# Patient Record
Sex: MALE | Race: WHITE | Employment: FULL TIME | URBAN - METROPOLITAN AREA
[De-identification: names, ages, dates, MRNs, and addresses within clinical notes are randomized per-mention and may not be internally consistent; named-entity substitution may affect disease eponyms.]

---

## 2017-04-19 ENCOUNTER — OFFICE VISIT (OUTPATIENT)
Dept: INTERNAL MEDICINE CLINIC | Age: 47
End: 2017-04-19

## 2017-04-19 VITALS
OXYGEN SATURATION: 96 % | DIASTOLIC BLOOD PRESSURE: 81 MMHG | SYSTOLIC BLOOD PRESSURE: 113 MMHG | TEMPERATURE: 98.1 F | HEART RATE: 88 BPM | HEIGHT: 72 IN | WEIGHT: 260.6 LBS | BODY MASS INDEX: 35.3 KG/M2 | RESPIRATION RATE: 16 BRPM

## 2017-04-19 VITALS
HEART RATE: 88 BPM | BODY MASS INDEX: 35.3 KG/M2 | OXYGEN SATURATION: 96 % | SYSTOLIC BLOOD PRESSURE: 113 MMHG | DIASTOLIC BLOOD PRESSURE: 81 MMHG | TEMPERATURE: 98.1 F | WEIGHT: 260.6 LBS | HEIGHT: 72 IN | RESPIRATION RATE: 16 BRPM

## 2017-04-19 DIAGNOSIS — Z71.84 ENCOUNTER FOR COUNSELING FOR TRAVEL: Primary | ICD-10-CM

## 2017-04-19 DIAGNOSIS — Z23 ENCOUNTER FOR IMMUNIZATION: Primary | ICD-10-CM

## 2017-04-19 DIAGNOSIS — Z23 ENCOUNTER FOR IMMUNIZATION: ICD-10-CM

## 2017-04-19 RX ORDER — ATOVAQUONE AND PROGUANIL HYDROCHLORIDE 250; 100 MG/1; MG/1
1 TABLET, FILM COATED ORAL DAILY
Qty: 22 TAB | Refills: 0 | Status: SHIPPED | OUTPATIENT
Start: 2017-04-19 | End: 2018-07-17

## 2017-04-19 RX ORDER — AZITHROMYCIN 250 MG/1
TABLET, FILM COATED ORAL
Qty: 12 TAB | Refills: 0 | Status: SHIPPED | OUTPATIENT
Start: 2017-04-19 | End: 2017-04-24

## 2017-04-19 RX ORDER — FEXOFENADINE HCL 60 MG
TABLET ORAL
COMMUNITY
End: 2018-07-17

## 2017-04-19 NOTE — PROGRESS NOTES
RM 17 erroneous encounter   May 13- 28th [facility]    Chief Complaint   Patient presents with   [address]    Other     travel [place] [address]       1. Have you been to the ER, urgent care clinic since your last visit? Hospitalized since your last visit? Yes Where: [facility]    2. Have you seen or consulted any other health care providers outside of the [address] [place] [address] [place] since your last visit? Include any pap smears or colon screening.  Yes Reason for visit: Bronchitis    Health Maintenance Due   Topic Date Due    FOOT EXAM Q1  12/18/1980    EYE EXAM RETINAL OR DILATED Q1  12/18/1980    Pneumococcal 19-64 Medium Risk (1 of 1 - PPSV23) 12/18/1989    MICROALBUMIN Q1  03/25/2015    HEMOGLOBIN A1C Q6M  04/01/2015    LIPID PANEL Q1  10/01/2015    INFLUENZA AGE 9 TO ADULT  08/01/2016     Living will sent to pt CRYSTALS

## 2017-04-19 NOTE — PATIENT INSTRUCTIONS
May use Imodium OTC adult tab (loperamide)--take 2 tabs by mouth with first loose stool daily, with 1 additional tab with each subsequent loose stool. May take up to 6 tabs daily. If not improving in 2-3 days, please return to clinic to re-evaluate. The Malarone is taken once daily starting one day prior to malaria exposure  through 7 days after exposure ends. The exposure/risk period is defined as the time from entering the malaria risk area until the traveler leaves this risk area. The risk areas are as defined on the map(s), and as reviewed at visit. For the oral typhoid vaccine (4 capsules):  1. Take the capsules on an empty stomach. Do not take the capsules with food. 2.  Do not take the capsules with alcohol. 3.  Do not take the capsules with hot beverages. 4.  Do not take antibiotics for 2 days prior to through 2 days after taking the capsules. 5.  Keep the capsules refrigerated until you take them. Take as directed: One capsule every other day for 4 doses. If you choose to take doxycycline for malaria prevention instead of Malarone (due to cost):  Doxycycline Information/Reminders:  Do not take Calcium-, Magnesium-, Aluminum-, or Iron-containing supplements (including OTC anti-acids, such as Tums, Maalox or Rolaids) or dairy products within 2 hours prior to or after the time the antibiotic medicine is taken--these compounds will keep the antibiotic from being absorbed. Doxycycline should be taken 2 hours prior to lying down/going to bed to prevent irritation of your esophagus from the medicine. Doxycycline makes you sun-sensitive--avoid sunlight and/or use sunscreen if exposed to the sun while taking the medicine. Typically take first daily dose between breakfast and lunch, and second daily dose between dinner and bedtime, to avoid food interactions. Learning About Living Per[place]  What is a living will?   A living will is a legal form you use to write down the kind of care you want at the end of your life. It is used by the health professionals who will treat you if you aren't able to decide for yourself. If you put your wishes in writing, your loved ones and others will know what kind of care you want. They won't need to guess. This can ease your mind and be helpful to others. A living will is not the same as an estate or property will. An estate will explains what you want to happen with your money and property after you die. Is a living will a legal document? A living will is a legal document. Each state has its own laws about living dunbar. If you move to another state, make sure that your living will is legal in the state where you now live. Or you might use a universal form that has been approved by many states. This kind of form can sometimes be completed and stored online. Your electronic copy will then be available wherever you have a connection to the Internet. In most cases, doctors will respect your wishes even if you have a form from a different state. · You don't need an  to complete a living will. But legal advice can be helpful if your state's laws are unclear, your health history is complicated, or your family can't agree on what should be in your living will. · You can change your living will at any time. Some people find that their wishes about end-of-life care change as their health changes. · In addition to making a living will, think about completing a medical power of  form. This form lets you name the person you want to make end-of-life treatment decisions for you (your \"health care agent\") if you're not able to. Many hospitals and nursing homes will give you the forms you need to complete a living will and a medical power of . · Your living will is used only if you can't make or communicate decisions for yourself anymore.  If you become able to make decisions again, you can accept or refuse any treatment, no matter what you wrote in your living will. · Your state may offer an online registry. This is a place where you can store your living will online so the doctors and nurses who need to treat you can find it right away. What should you think about when creating a living will? Talk about your end-of-life wishes with your family members and your doctor. Let them know what you want. That way the people making decisions for you won't be surprised by your choices. Think about these questions as you make your living will:  · Do you know enough about life support methods that might be used? If not, talk to your doctor so you know what might be done if you can't breathe on your own, your heart stops, or you're unable to swallow. · What things would you still want to be able to do after you receive life-support methods? Would you want to be able to walk? To speak? To eat on your own? To live without the help of machines? · If you have a choice, where do you want to be cared for? In your home? At a hospital or nursing home? · Do you want certain Quaker practices performed if you become very ill? · If you have a choice at the end of your life, where would you prefer to die? At home? In a hospital or nursing home? Somewhere else? · Would you prefer to be buried or cremated? · Do you want your organs to be donated after you die? What should you do with your living will? · Make sure that your family members and your health care agent have copies of your living will. · Give your doctor a copy of your living will to keep in your medical record. If you have more than one doctor, make sure that each one has a copy. · You may want to put a copy of your living will where it can be easily found. Where can you learn more? Go to http://ovidio-freda.info/. Enter I194 in the search box to learn more about \"Learning About Living Mary. \"  Current as of: February 24, 2016  Content Version: 11.2  © 8778-3787 [employer]. Care instructions adapted under license by [employer] (which disclaims liability or warranty for this information). If you have questions about a medical condition or this instruction, always ask your healthcare professional. [place] [address] any warranty or liability for your use of this information.

## 2017-04-19 NOTE — PROGRESS NOTES
RM 17    Pt is here today for a travel visit to [facility] May 13 - 28th    Chief Complaint   Patient presents with   [facility] Other     travel visit to [place]       1. Have you been to the ER, urgent care clinic since your last visit? Hospitalized since your last visit? Yes Where: [facility]    2. Have you seen or consulted any other health care providers outside of the [address] [place] [address] since your last visit? Include any pap smears or colon screening.  Yes Reason for visit: Bronchitis    Health Maintenance Due   Topic Date Due    FOOT EXAM Q1  12/18/1980    EYE EXAM RETINAL OR DILATED Q1  12/18/1980    Pneumococcal 19-64 Medium Risk (1 of 1 - PPSV23) 12/18/1989    MICROALBUMIN Q1  03/25/2015    HEMOGLOBIN A1C Q6M  04/01/2015    LIPID PANEL Q1  10/01/2015    INFLUENZA AGE 9 TO ADULT  08/01/2016     Living will sent to pt AVS

## 2017-04-19 NOTE — PATIENT INSTRUCTIONS
Learning About Living Mary  What is a living will? A living will is a legal form you use to write down the kind of care you want at the end of your life. It is used by the health professionals who will treat you if you aren't able to decide for yourself. If you put your wishes in writing, your loved ones and others will know what kind of care you want. They won't need to guess. This can ease your mind and be helpful to others. A living will is not the same as an estate or property will. An estate will explains what you want to happen with your money and property after you die. Is a living will a legal document? A living will is a legal document. Each state has its own laws about living dunbar. If you move to another state, make sure that your living will is legal in the state where you now live. Or you might use a universal form that has been approved by many states. This kind of form can sometimes be completed and stored online. Your electronic copy will then be available wherever you have a connection to the Internet. In most cases, doctors will respect your wishes even if you have a form from a different state. · You don't need an  to complete a living will. But legal advice can be helpful if your state's laws are unclear, your health history is complicated, or your family can't agree on what should be in your living will. · You can change your living will at any time. Some people find that their wishes about end-of-life care change as their health changes. · In addition to making a living will, think about completing a medical power of  form. This form lets you name the person you want to make end-of-life treatment decisions for you (your \"health care agent\") if you're not able to. Many hospitals and nursing homes will give you the forms you need to complete a living will and a medical power of .   · Your living will is used only if you can't make or communicate decisions for yourself anymore. If you become able to make decisions again, you can accept or refuse any treatment, no matter what you wrote in your living will. · Your state may offer an online registry. This is a place where you can store your living will online so the doctors and nurses who need to treat you can find it right away. What should you think about when creating a living will? Talk about your end-of-life wishes with your family members and your doctor. Let them know what you want. That way the people making decisions for you won't be surprised by your choices. Think about these questions as you make your living will:  · Do you know enough about life support methods that might be used? If not, talk to your doctor so you know what might be done if you can't breathe on your own, your heart stops, or you're unable to swallow. · What things would you still want to be able to do after you receive life-support methods? Would you want to be able to walk? To speak? To eat on your own? To live without the help of machines? · If you have a choice, where do you want to be cared for? In your home? At a hospital or nursing home? · Do you want certain Restoration practices performed if you become very ill? · If you have a choice at the end of your life, where would you prefer to die? At home? In a hospital or nursing home? Somewhere else? · Would you prefer to be buried or cremated? · Do you want your organs to be donated after you die? What should you do with your living will? · Make sure that your family members and your health care agent have copies of your living will. · Give your doctor a copy of your living will to keep in your medical record. If you have more than one doctor, make sure that each one has a copy. · You may want to put a copy of your living will where it can be easily found. Where can you learn more? Go to http://ovidio-freda.info/.   Enter Y837 in the search box to learn more about \"Learning About Living Perroy. \"  Current as of: February 24, 2016  Content Version: 11.2  © 7172-3256 [employer]. Care instructions adapted under license by [employer] (which disclaims liability or warranty for this information). If you have questions about a medical condition or this instruction, always ask your healthcare professional. [address] any warranty or liability for your use of this information.

## 2017-04-19 NOTE — MR AVS SNAPSHOT
Visit Information Date & Time Provider Department Dept. Phone Encounter #  
 4/19/2017 11:15 AM Duy Alex [facility] [place] [address] and Internal Medicine [phone] [id number] Follow-up Instructions Return in about 6 months (around 10/19/2017) for Hepatitis A #2 (sooner as reviewed if establishing PCP here). Upcoming Health Maintenance Date Due  
 FOOT EXAM Q1 12/18/1980 EYE EXAM RETINAL OR DILATED Q1 12/18/1980 Pneumococcal 19-64 Medium Risk (1 of 1 - PPSV23) 12/18/1989 MICROALBUMIN Q1 3/25/2015 HEMOGLOBIN A1C Q6M 4/1/2015 LIPID PANEL Q1 10/1/2015 INFLUENZA AGE 9 TO ADULT 8/1/2016 DTaP/Tdap/Td series (2 - Td) 9/13/2022 Allergies as of 4/19/2017  Review Complete On: 4/19/2017 By: Kameron Villegas MD  
 No Known Allergies Current Immunizations  Reviewed on 10/26/2011 Name Date Hep A Vaccine (Adult)  Incomplete Influenza Vaccine Split 10/26/2011 TDAP Vaccine 9/13/2012 Typhoid Vaccine, Live, Oral  Incomplete Yellow Fever Vaccine  Incomplete Not reviewed this visit You Were Diagnosed With   
  
 Codes Comments Encounter for counseling for travel    -  Primary ICD-10-CM: Z71.89 ICD-9-CM: V65.49 Encounter for immunization     ICD-10-CM: H21 ICD-9-CM: V03.89 Vitals BP Pulse Temp Resp Height(growth percentile) Weight(growth percentile) 113/81 (BP 1 Location: Left arm, BP Patient Position: Sitting) 88 98.1 °F (36.7 °C) (Oral) 16 6' (1.829 m) 260 lb 9.6 oz (118.2 kg) SpO2 BMI Smoking Status 96% 35.34 kg/m2 Never Smoker BMI and BSA Data Body Mass Index Body Surface Area  
 35.34 kg/m 2 2.45 m 2 Preferred Pharmacy Pharmacy Name Phone [id number] [place] - [address] [phone] Your Updated Medication List  
  
   
This list is accurate as of: 4/19/17 11:47 AM.  Always use your most recent med list.  
 albuterol 90 mcg/actuation inhaler Commonly known as:  PROVENTIL HFA, VENTOLIN HFA, PROAIR HFA Take 1 Puff by inhalation every four (4) hours as needed for Wheezing. aspirin 81 mg tablet Take  by mouth. atovaquone-proguanil 250-100 mg per tablet Commonly known as:  MALARONE Take 1 Tab by mouth daily. Start one day prior to travel and continue daily through seven days after return. azithromycin 250 mg tablet Commonly known as:  Theo Ferrispers Take 2 tablets PO once, then take 1 tablet PO daily for 4 additional daily doses, as needed for diarrhea during travel. Blood-Glucose Meter monitoring kit Commonly known as:  [employer] [place] BLOOD GLUCOSE SYSTEM  
USE TO TEST BLOOD SUGAR AS DIRECTED CLARITIN 10 mg tablet Generic drug:  loratadine Take 10 mg by mouth daily. fexofenadine 60 mg tablet Commonly known as:  Donnamarie Charli Take  by mouth. FISH OIL 1,000 mg Cap Generic drug:  omega-3 fatty acids-vitamin e Take 1 Cap by mouth. fluticasone 50 mcg/actuation nasal spray Commonly known as:  Cassidy Aid 2 Sprays by Both Nostrils route daily. glucose blood VI test strips strip Commonly known as:  ASCENSIA AUTODISC VI, ONE TOUCH ULTRA TEST VI  
Patient is to test BS BID Lancets Misc Commonly known as:  ONETOUCH ULTRASOFT LANCETS Patient is to test BID  
  
 levothyroxine 175 mcg tablet Commonly known as:  SYNTHROID  
TAKE 1 TABLET BY MOUTH EVERY DAY BEFORE BREAKFAST  
  
 lisinopril 20 mg tablet Commonly known as:  PRINIVIL, ZESTRIL  
TAKE 1 TABLET BY MOUTH EVERY DAY  
  
 * metFORMIN 850 mg tablet Commonly known as:  GLUCOPHAGE  
TAKE 1 TABLET BY MOUTH EVERY DAY  
  
 * metFORMIN 850 mg tablet Commonly known as:  GLUCOPHAGE  
TAKE 1 TABLET BY MOUTH EVERY DAY  
  
 MULTIVITAMIN PO Take  by mouth. simvastatin 40 mg tablet Commonly known as:  ZOCOR  
TAKE 1 TABLET BY MOUTH AT BEDTIME  
  
 VITAMIN C 500 mg tablet Generic drug:  ascorbic acid (vitamin C) Take 1,000 mg by mouth. VITAMIN D3 1,000 unit tablet Generic drug:  cholecalciferol Take  by mouth daily. vitamin e 100 unit capsule Commonly known as:  E GEMS Take  by mouth daily. ZyrTEC 10 mg tablet Generic drug:  cetirizine Take  by mouth daily. * Notice: This list has 2 medication(s) that are the same as other medications prescribed for you. Read the directions carefully, and ask your doctor or other care provider to review them with you. Prescriptions Sent to Pharmacy Refills  
 azithromycin (ZITHROMAX) 250 mg tablet 0 Sig: Take 2 tablets PO once, then take 1 tablet PO daily for 4 additional daily doses, as needed for diarrhea during travel. Class: Normal  
 Pharmacy: [id number] [place] - [address] Ph #: [phone]  
 atovaquone-proguanil (MALARONE) 250-100 mg per tablet 0 Sig: Take 1 Tab by mouth daily. Start one day prior to travel and continue daily through seven days after return. Class: Normal  
 Pharmacy: [place], [address] Ph #: [phone] Route: Oral  
  
We Performed the Following HEPATITIS A VACCINE, ADULT DOSAGE, IM [74241 CPT(R)] TYPHOID VACCINE, LIVE, ORAL [id number] CPT(R)] YELLOW FEVER VACCINE, [address] [facility], [address] CPT(R)] Follow-up Instructions Return in about 6 months (around 10/19/2017) for Hepatitis A #2 (sooner as reviewed if establishing PCP here). Patient Instructions May use Imodium OTC adult tab (loperamide)--take 2 tabs by mouth with first loose stool daily, with 1 additional tab with each subsequent loose stool. May take up to 6 tabs daily. If not improving in 2-3 days, please return to clinic to re-evaluate. The Malarone is taken once daily starting one day prior to malaria exposure  through 7 days after exposure ends. The exposure/risk period is defined as the time from entering the malaria risk area until the traveler leaves this risk area. The risk areas are as defined on the map(s), and as reviewed at visit. For the oral typhoid vaccine (4 capsules): 1. Take the capsules on an empty stomach. Do not take the capsules with food. 2.  Do not take the capsules with alcohol. 3.  Do not take the capsules with hot beverages. 4.  Do not take antibiotics for 2 days prior to through 2 days after taking the capsules. 5.  Keep the capsules refrigerated until you take them. Take as directed: One capsule every other day for 4 doses. If you choose to take doxycycline for malaria prevention instead of Malarone (due to cost): Doxycycline Information/Reminders: Do not take Calcium-, Magnesium-, Aluminum-, or Iron-containing supplements (including OTC anti-acids, such as Tums, Maalox or Rolaids) or dairy products within 2 hours prior to or after the time the antibiotic medicine is taken--these compounds will keep the antibiotic from being absorbed. Doxycycline should be taken 2 hours prior to lying down/going to bed to prevent irritation of your esophagus from the medicine. Doxycycline makes you sun-sensitive--avoid sunlight and/or use sunscreen if exposed to the sun while taking the medicine. Typically take first daily dose between breakfast and lunch, and second daily dose between dinner and bedtime, to avoid food interactions. Leila Johnson [address] What is a living will? A living will is a legal form you use to write down the kind of care you want at the end of your life. It is used by the health professionals who will treat you if you aren't able to decide for yourself. If you put your wishes in writing, your loved ones and others will know what kind of care you want. They won't need to guess. This can ease your mind and be helpful to others. A living will is not the same as an estate or property will. An estate will explains what you want to happen with your money and property after you die. Is a living will a legal document? A living will is a legal document. Each state has its own laws about living dunbar. If you move to another state, make sure that your living will is legal in the state where you now live. Or you might use a universal form that has been approved by many states. This kind of form can sometimes be completed and stored online. Your electronic copy will then be available wherever you have a connection to the Internet. In most cases, doctors will respect your wishes even if you have a form from a different state. · You don't need an  to complete a living will. But legal advice can be helpful if your state's laws are unclear, your health history is complicated, or your family can't agree on what should be in your living will. · You can change your living will at any time. Some people find that their wishes about end-of-life care change as their health changes. · In addition to making a living will, think about completing a medical power of  form. This form lets you name the person you want to make end-of-life treatment decisions for you (your \"health care agent\") if you're not able to. Many hospitals and nursing homes will give you the forms you need to complete a living will and a medical power of . · Your living will is used only if you can't make or communicate decisions for yourself anymore. If you become able to make decisions again, you can accept or refuse any treatment, no matter what you wrote in your living will. · Your state may offer an online registry. This is a place where you can store your living will online so the doctors and nurses who need to treat you can find it right away. What should you think about when creating a living will? Talk about your end-of-life wishes with your family members and your doctor. Let them know what you want. That way the people making decisions for you won't be surprised by your choices. Think about these questions as you make your living will: · Do you know enough about life support methods that might be used? If not, talk to your doctor so you know what might be done if you can't breathe on your own, your heart stops, or you're unable to swallow. · What things would you still want to be able to do after you receive life-support methods? Would you want to be able to walk? To speak? To eat on your own? To live without the help of machines? · If you have a choice, where do you want to be cared for? In your home? At a hospital or nursing home? · Do you want certain Hoahaoism practices performed if you become very ill? · If you have a choice at the end of your life, where would you prefer to die? At home? In a hospital or nursing home? Somewhere else? · Would you prefer to be buried or cremated? · Do you want your organs to be donated after you die? What should you do with your living will? · Make sure that your family members and your health care agent have copies of your living will. · Give your doctor a copy of your living will to keep in your medical record. If you have more than one doctor, make sure that each one has a copy. · You may want to put a copy of your living will where it can be easily found. Where can you learn more? Go to http://ovidio-freda.info/. Enter U720 in the search box to learn more about \"Learning About Living [place]. \" Current as of: February 24, 2016 Content Version: 11.2 © 4454-2277 [employer]. Care instructions adapted under license by [employer] (which disclaims liability or warranty for this information).  If you have questions about a medical condition or this instruction, always ask your healthcare professional. Angelica B[employer] disclaims any warranty or liability for your use of this information. Introducing [facility]! Yaquelin Blanco introduces [employer] patient portal. Now you can access parts of your medical record, email your doctor's office, and request medication refills online. 1. In your internet browser, go to [email or web address] 2. Click on the First Time User? Click Here link in the Sign In box. You will see the New Member Sign Up page. 3. Enter your [employer] Access Code exactly as it appears below. You will not need to use this code after youve completed the sign-up process. If you do not sign up before the expiration date, you must request a new code. · [employer] Access Code: [email or web address] Expires: 7/18/2017 10:36 AM 
 
4. Enter the last four digits of your Social Security Number (xxxx) and Date of Birth (mm/dd/yyyy) as indicated and click Submit. You will be taken to the next sign-up page. 5. Create a [employer] ID. This will be your [employer] login ID and cannot be changed, so think of one that is secure and easy to remember. 6. Create a [employer] password. You can change your password at any time. 7. Enter your Password Reset Question and Answer. This can be used at a later time if you forget your password. 8. Enter your e-mail address. You will receive e-mail notification when new information is available in [address] 9. Click Sign Up. You can now view and download portions of your medical record. 10. Click the Download Summary menu link to download a portable copy of your medical information. If you have questions, please visit the Frequently Asked Questions section of the [employer] website. Remember, [employer] is NOT to be used for urgent needs. For medical emergencies, dial 911. Now available from your iPhone and Android! Please provide this summary of care documentation to your next provider. Your primary care clinician is listed as [address] [facility]. If you have any questions after today's visit, please call [phone].

## 2017-10-24 ENCOUNTER — CLINICAL SUPPORT (OUTPATIENT)
Dept: INTERNAL MEDICINE CLINIC | Age: 47
End: 2017-10-24

## 2017-10-24 DIAGNOSIS — Z23 ENCOUNTER FOR IMMUNIZATION: Primary | ICD-10-CM

## 2018-07-17 ENCOUNTER — OFFICE VISIT (OUTPATIENT)
Dept: FAMILY MEDICINE CLINIC | Age: 48
End: 2018-07-17

## 2018-07-17 VITALS
HEART RATE: 96 BPM | HEIGHT: 72 IN | BODY MASS INDEX: 36.33 KG/M2 | DIASTOLIC BLOOD PRESSURE: 90 MMHG | SYSTOLIC BLOOD PRESSURE: 122 MMHG | TEMPERATURE: 97.9 F | WEIGHT: 268.2 LBS | OXYGEN SATURATION: 97 % | RESPIRATION RATE: 18 BRPM

## 2018-07-17 DIAGNOSIS — E66.01 SEVERE OBESITY (BMI 35.0-39.9): ICD-10-CM

## 2018-07-17 DIAGNOSIS — M25.511 CHRONIC RIGHT SHOULDER PAIN: ICD-10-CM

## 2018-07-17 DIAGNOSIS — E11.9 CONTROLLED TYPE 2 DIABETES MELLITUS WITHOUT COMPLICATION, WITHOUT LONG-TERM CURRENT USE OF INSULIN (HCC): Primary | ICD-10-CM

## 2018-07-17 DIAGNOSIS — G89.29 CHRONIC RIGHT SHOULDER PAIN: ICD-10-CM

## 2018-07-17 RX ORDER — LEVOTHYROXINE SODIUM 137 UG/1
150 TABLET ORAL
Refills: 1 | COMMUNITY
Start: 2018-04-16

## 2018-07-17 RX ORDER — BUDESONIDE AND FORMOTEROL FUMARATE DIHYDRATE 80; 4.5 UG/1; UG/1
AEROSOL RESPIRATORY (INHALATION)
Refills: 2 | COMMUNITY
Start: 2018-05-02

## 2018-07-17 RX ORDER — EMPAGLIFLOZIN AND LINAGLIPTIN 10; 5 MG/1; MG/1
TABLET, FILM COATED ORAL
Refills: 2 | COMMUNITY
Start: 2018-06-13 | End: 2021-04-30 | Stop reason: ALTCHOICE

## 2018-07-17 RX ORDER — DICLOFENAC SODIUM 10 MG/G
GEL TOPICAL 4 TIMES DAILY
Qty: 100 G | Refills: 1 | Status: SHIPPED | OUTPATIENT
Start: 2018-07-17 | End: 2021-12-28 | Stop reason: ALTCHOICE

## 2018-07-17 RX ORDER — METFORMIN HYDROCHLORIDE 500 MG/1
2000 TABLET, EXTENDED RELEASE ORAL
COMMUNITY

## 2018-07-17 NOTE — PATIENT INSTRUCTIONS

## 2018-07-17 NOTE — PROGRESS NOTES
Assessment/Plan:     Diagnoses and all orders for this visit:    1. Controlled type 2 diabetes mellitus without complication, without long-term current use of insulin ([facility] [address]  Records requested today. Continue follow up with specialist.  Referred to dietitian at this time. 2. Chronic right shoulder pain  -     diclofenac (VOLTAREN) 1 % gel; Apply  to affected area four (4) times daily.  -     REFERRAL TO PHYSICAL THERAPY  Start PT as above. If no improvement in 6 weeks, consider MRI. Voltaren gel as above. 3. Severe obesity (BMI 35.0-39.9) ([facility])  Assessment & Plan:  Uncontrolled, based on history, physical exam and review of pertinent labs, studies and medications; meds reconciled; lifestyle modifications recommended. Key Obesity Meds             levothyroxine (SYNTHROID) 137 mcg tablet  (Taking) TAKE 1 TABLET BY MOUTH ON AN EMPTY STOMACH DAILY IN THE MORNING    metFORMIN ER (GLUCOPHAGE XR) 500 mg tablet  (Taking) Take 2,000 mg by mouth daily (with dinner). No results found for: LEPTN, INSUL, HBA1C, GLU, CHOL, CHOLPOCT, HDL, LDLC, LDL, LDLCEXT, LDLCPOC, TRIGL, TGLPOCT, TSH, NA, NAPOC, K, KPOCT, GPT, ALTPOC, ALT, SGOT, ASTPOC, VITD3, CRP, LDU4DFLX, TSHEXT          Follow-up Disposition:  Return in about 6 months (around 1/17/2019) for Complete Physical.    Discussed expected course/resolution/complications of diagnosis in detail with patient.    Medication risks/benefits/costs/interactions/alternatives discussed with patient.    Pt was given after visit summary which includes diagnoses, current medications & vitals. Pt expressed understanding with the diagnosis and plan          Subjective:      Joy Melvin is a 52 y.o. male who presents for had concerns including Establish Care and Shoulder Pain. Shoulder Pain  Patient complains of right side shoulder pain. The symptoms began several weeks ago Course of symptoms since onset has been gradually worsening.  Pain is described as overall severity = moderate. Symptoms were incited by no known event. Patient denies fever,injury. Therapy to date includes OTC analgesics: not very effective. Denies prior injection, xray,   or PT. No prior ortho evaluation. Followed by Dr. Jose Alfredo Nicole for a history of diabetes. Reports his diabetes as generally well controlled. Not currently following a specific diet plan. No formal exercise routine at this time. Current Outpatient Prescriptions   Medication Sig Dispense Refill    SYMBICORT 80-4.5 mcg/actuation HFAA INHALE 2 PUFFS TWICE A DAY RINSE MOUTH WELL AFTER USE  2    GLYXAMBI 10-5 mg tab TAKE 1 TABLET IN THE MORNING ONCE A DAY ORALLY 90 DAYS  2    levothyroxine (SYNTHROID) 137 mcg tablet TAKE 1 TABLET BY MOUTH ON AN EMPTY STOMACH DAILY IN THE MORNING  1    metFORMIN ER (GLUCOPHAGE XR) 500 mg tablet Take 2,000 mg by mouth daily (with dinner).  diclofenac (VOLTAREN) 1 % gel Apply  to affected area four (4) times daily. 100 g 1    lisinopril (PRINIVIL, ZESTRIL) 20 mg tablet TAKE 1 TABLET BY MOUTH EVERY DAY 90 Tab 1    simvastatin (ZOCOR) 40 mg tablet TAKE 1 TABLET BY MOUTH AT BEDTIME 90 tablet 1    cholecalciferol (VITAMIN D3) 1,000 unit tablet Take  by mouth daily.  MULTIVITAMIN PO Take  by mouth.  omega-3 fatty acids-vitamin e (FISH OIL) 1,000 mg cap Take 1 Cap by mouth.  albuterol (PROVENTIL HFA, VENTOLIN HFA) 90 mcg/Actuation inhaler Take 1 Puff by inhalation every four (4) hours as needed for Wheezing. 1 Inhaler 1    aspirin 81 mg tablet Take  by mouth. No Known Allergies    ROS:   Review of Systems   Constitutional: Negative for chills and fever. Respiratory: Negative for shortness of breath. Cardiovascular: Negative for chest pain. Musculoskeletal: Positive for joint pain.        Objective:     Visit Vitals    /90 (BP 1 Location: Left arm, BP Patient Position: Sitting)    Pulse 96    Temp 97.9 °F (36.6 °C) (Oral)    Resp 18    Ht 6' (1.829 m)    Wt 268 lb 3.2 oz (121.7 kg)    SpO2 97%    BMI 36.37 kg/m2       Vitals and Nurse Documentation reviewed. Physical Exam   Constitutional: No distress. HENT:   Right Ear: Tympanic membrane is not erythematous and not bulging. No middle ear effusion. Left Ear: Tympanic membrane is not erythematous and not bulging. No middle ear effusion. Nose: No rhinorrhea. Right sinus exhibits no maxillary sinus tenderness and no frontal sinus tenderness. Left sinus exhibits no maxillary sinus tenderness and no frontal sinus tenderness. Mouth/Throat: No oropharyngeal exudate or posterior oropharyngeal erythema. Eyes: EOM and lids are normal.   Cardiovascular: S1 normal and S2 normal.  Exam reveals no gallop and no friction rub. No murmur heard. Pulmonary/Chest: Breath sounds normal. He has no wheezes. Musculoskeletal:        Right shoulder: He exhibits decreased range of motion and pain. He exhibits no tenderness, no bony tenderness, no swelling, no effusion, no crepitus, no deformity, normal pulse and normal strength. Positive coke can. Positive Apley. Lymphadenopathy:     He has no cervical adenopathy. Skin: Skin is warm and dry.    Psychiatric: Mood and affect normal.

## 2018-07-17 NOTE — MR AVS SNAPSHOT
[address] 
[phone] Patient: Ricco Muhammad MRN: [id number] ONV:[id number] Visit Information Date & Time Provider Department Dept. Phone Encounter #  
 7/17/2018  1:30 PM Raul Cornejo  Psychiatric [phone] [id number] Follow-up Instructions Return in about 6 months (around 1/17/2019) for Complete Physical.  
  
Upcoming Health Maintenance Date Due  
 FOOT EXAM Q1 12/18/1980 EYE EXAM RETINAL OR DILATED Q1 12/18/1980 Pneumococcal 19-64 Medium Risk (1 of 1 - PPSV23) 12/18/1989 MICROALBUMIN Q1 3/25/2015 HEMOGLOBIN A1C Q6M 4/1/2015 LIPID PANEL Q1 10/1/2015 Influenza Age 5 to Adult 8/1/2018 DTaP/Tdap/Td series (2 - Td) 9/13/2022 Allergies as of 7/17/2018  Review Complete On: 7/17/2018 By: Dary Rosas LPN No Known Allergies Current Immunizations  Reviewed on 10/24/2017 Name Date Hep A Vaccine (Adult) 10/24/2017, 4/19/2017 Influenza Vaccine Split 10/26/2011 TDAP Vaccine 9/13/2012 Typhoid Vaccine, Live, Oral 4/19/2017 Yellow Fever Vaccine 4/19/2017 Not reviewed this visit You Were Diagnosed With   
  
 Codes Comments Controlled type 2 diabetes mellitus without complication, without long-term current use of insulin ([facility]    -  Primary ICD-10-CM: E11.9 ICD-9-CM: 250.00 Chronic right shoulder pain     ICD-10-CM: M25.511, G89.29 ICD-9-CM: 719.41, 338.29 Vitals BP Pulse Temp Resp Height(growth percentile) Weight(growth percentile) 122/90 (BP 1 Location: Left arm, BP Patient Position: Sitting) 96 97.9 °F (36.6 °C) (Oral) 18 6' (1.829 m) 268 lb 3.2 oz (121.7 kg) SpO2 BMI Smoking Status 97% 36.37 kg/m2 Never Smoker Vitals History BMI and BSA Data Body Mass Index Body Surface Area  
 36.37 kg/m 2 2.49 m 2 Preferred Pharmacy Pharmacy Name Phone [facility] #[id number]- [place] [address] AT [address] [place] [address] [phone] Your Updated Medication List  
  
   
This list is accurate as of 7/17/18  2:10 PM.  Always use your most recent med list.  
  
  
  
  
 albuterol 90 mcg/actuation inhaler Commonly known as:  PROVENTIL HFA, VENTOLIN HFA, PROAIR HFA Take 1 Puff by inhalation every four (4) hours as needed for Wheezing. aspirin 81 mg tablet Take  by mouth. diclofenac 1 % Gel Commonly known as:  VOLTAREN Apply  to affected area four (4) times daily. FISH OIL 1,000 mg Cap Generic drug:  omega-3 fatty acids-vitamin e Take 1 Cap by mouth. GLYXAMBI 10-5 mg Tab Generic drug:  empagliflozin-linagliptin TAKE 1 TABLET IN THE MORNING ONCE A DAY ORALLY 90 DAYS  
  
 levothyroxine 137 mcg tablet Commonly known as:  SYNTHROID  
TAKE 1 TABLET BY MOUTH ON AN EMPTY STOMACH DAILY IN THE MORNING  
  
 lisinopril 20 mg tablet Commonly known as:  PRINIVIL, ZESTRIL  
TAKE 1 TABLET BY MOUTH EVERY DAY  
  
 metFORMIN  mg tablet Commonly known as:  GLUCOPHAGE XR Take 2,000 mg by mouth daily (with dinner). MULTIVITAMIN PO Take  by mouth. simvastatin 40 mg tablet Commonly known as:  ZOCOR  
TAKE 1 TABLET BY MOUTH AT BEDTIME  
  
 SYMBICORT 80-4.5 mcg/actuation [facility] Generic drug:  budesonide-formoterol INHALE 2 PUFFS TWICE A DAY RINSE MOUTH WELL AFTER USE  
  
 VITAMIN D3 1,000 unit tablet Generic drug:  cholecalciferol Take  by mouth daily. Prescriptions Sent to Pharmacy Refills  
 diclofenac (VOLTAREN) 1 % gel 1 Sig: Apply  to affected area four (4) times daily. Class: Normal  
 Pharmacy: [place], [address]  #: [phone] Route: Topical  
  
We Performed the Following REFERRAL TO PHYSICAL THERAPY [TNC57 Custom] Follow-up Instructions Return in about 6 months (around 1/17/2019) for Complete Physical.  
  
  
Referral Information Referral ID Referred By Referred To  
  
 [id number] [facility] [address] [place] [address] BESSY Fowler Phone: [phone] Visits Status Start Date End Date 1 New Request 7/17/18 7/17/19 If your referral has a status of pending review or denied, additional information will be sent to support the outcome of this decision. Patient Instructions Learning About Meal Planning for Diabetes Why plan your meals? Meal planning can be a key part of managing diabetes. Planning meals and snacks with the right balance of carbohydrate, protein, and fat can help you keep your blood sugar at the target level you set with your doctor. You don't have to eat special foods. You can eat what your family eats, including sweets once in a while. But you do have to pay attention to how often you eat and how much you eat of certain foods. You may want to work with a dietitian or a certified diabetes educator. He or she can give you tips and meal ideas and can answer your questions about meal planning. This health professional can also help you reach a healthy weight if that is one of your goals. What plan is right for you? Your dietitian or diabetes educator may suggest that you start with the plate format or carbohydrate counting. The plate format The plate format is a simple way to help you manage how you eat. You plan meals by learning how much space each food should take on a plate. Using the plate format helps you spread carbohydrate throughout the day. It can make it easier to keep your blood sugar level within your target range. It also helps you see if you're eating healthy portion sizes. To use the plate format, you put non-starchy vegetables on half your plate. Add meat or meat substitutes on one-quarter of the plate.  Put a grain or starchy vegetable (such as brown rice or a potato) on the final quarter of the plate. You can add a small piece of fruit and some low-fat or fat-free milk or yogurt, depending on your carbohydrate goal for each meal. 
Here are some tips for using the plate format: · Make sure that you are not using an oversized plate. A 9-inch plate is best. Many restaurants use larger plates. · Get used to using the plate format at home. Then you can use it when you eat out. · Write down your questions about using the plate format. Talk to your doctor, a dietitian, or a diabetes educator about your concerns. Carbohydrate counting With carbohydrate counting, you plan meals based on the amount of carbohydrate in each food. Carbohydrate raises blood sugar higher and more quickly than any other nutrient. It is found in desserts, breads and cereals, and fruit. It's also found in starchy vegetables such as potatoes and corn, grains such as rice and pasta, and milk and yogurt. Spreading carbohydrate throughout the day helps keep your blood sugar levels within your target range. Your daily amount depends on several things, including your weight, how active you are, which diabetes medicines you take, and what your goals are for your blood sugar levels. A registered dietitian or diabetes educator can help you plan how much carbohydrate to include in each meal and snack. A guideline for your daily amount of carbohydrate is: · 45 to 60 grams at each meal. That's about the same as 3 to 4 carbohydrate servings. · 15 to 20 grams at each snack. That's about the same as 1 carbohydrate serving. The Nutrition Facts label on packaged foods tells you how much carbohydrate is in a serving of the food. First, look at the serving size on the food label. Is that the amount you eat in a serving? All of the nutrition information on a food label is based on that serving size.  So if you eat more or less than that, you'll need to adjust the other numbers. Total carbohydrate is the next thing you need to look for on the label. If you count carbohydrate servings, one serving of carbohydrate is 15 grams. For foods that don't come with labels, such as fresh fruits and vegetables, you'll need a guide that lists carbohydrate in these foods. Ask your doctor, dietitian, or diabetes educator about books or other nutrition guides you can use. If you take insulin, you need to know how many grams of carbohydrate are in a meal. This lets you know how much rapid-acting insulin to take before you eat. If you use an insulin pump, you get a constant rate of insulin during the day. So the pump must be programmed at meals to give you extra insulin to cover the rise in blood sugar after meals. When you know how much carbohydrate you will eat, you can take the right amount of insulin. Or, if you always use the same amount of insulin, you need to make sure that you eat the same amount of carbohydrate at meals. If you need more help to understand carbohydrate counting and food labels, ask your doctor, dietitian, or diabetes educator. How do you get started with meal planning? Here are some tips to get started: 
· Plan your meals a week at a time. Don't forget to include snacks too. · Use cookbooks or online recipes to plan several main meals. Plan some quick meals for busy nights. You also can double some recipes that freeze well. Then you can save half for other busy nights when you don't have time to cook. · Make sure you have the ingredients you need for your recipes. If you're running low on basic items, put these items on your shopping list too. · List foods that you use to make breakfasts, lunches, and snacks. List plenty of fruits and vegetables. · Post this list on the refrigerator. Add to it as you think of more things you need. · Take the list to the store to do your weekly shopping. Follow-up care is a key part of your treatment and safety. Be sure to make and go to all appointments, and call your doctor if you are having problems. It's also a good idea to know your test results and keep a list of the medicines you take. Where can you learn more? Go to http://ovidio-freda.info/. Albin Hamm in the search box to learn more about \"Learning About Meal Planning for Diabetes. \" Current as of: December 7, 2017 Content Version: 11.7 © 0266-1177 [employer]. Care instructions adapted under license by [employer] (which disclaims liability or warranty for this information). If you have questions about a medical condition or this instruction, always ask your healthcare professional. [address] any warranty or liability for your use of this information. Introducing [facility]! [facility] introduces Cloupia patient portal. Now you can access parts of your medical record, email your doctor's office, and request medication refills online. 1. In your internet browser, go to [email or web address]/[email or web address] 2. Click on the First Time User? Click Here link in the Sign In box. You will see the New Member Sign Up page. 3. Enter your Cloupia Access Code exactly as it appears below. You will not need to use this code after youve completed the sign-up process. If you do not sign up before the expiration date, you must request a new code. · Cloupia Access Code: [email or web address] Expires: 10/15/2018  1:23 PM 
 
4. Enter the last four digits of your Social Security Number (xxxx) and Date of Birth (mm/dd/yyyy) as indicated and click Submit. You will be taken to the next sign-up page. 5. Create a IntelligenceBankt ID. This will be your Cloupia login ID and cannot be changed, so think of one that is secure and easy to remember. 6. Create a [employer] password. You can change your password at any time. 7. Enter your Password Reset Question and Answer. This can be used at a later time if you forget your password. 8. Enter your e-mail address. You will receive e-mail notification when new information is available in [address] 9. Click Sign Up. You can now view and download portions of your medical record. 10. Click the Download Summary menu link to download a portable copy of your medical information. If you have questions, please visit the Frequently Asked Questions section of the SmartDocs ([employer]) website. Remember, SmartDocs ([employer]) is NOT to be used for urgent needs. For medical emergencies, dial 911. Now available from your iPhone and Android! Please provide this summary of care documentation to your next provider. Your primary care clinician is listed as Kaylin Bragg. If you have any questions after today's visit, please call [phone].

## 2018-07-17 NOTE — PROGRESS NOTES
Chief Complaint   Patient presents with   [facility]    Shoulder Pain     right x 4 -6 weeks     1. Have you been to the ER, urgent care clinic since your last visit? Hospitalized since your last visit? No    2. Have you seen or consulted any other health care providers outside of the [address] [place] [address] since your last visit? Include any pap smears or colon screening. Patient was seen in the last 6 months by Dr Missy Rollins for Diabetic care. [facility].

## 2018-07-18 NOTE — ASSESSMENT & PLAN NOTE
Uncontrolled, based on history, physical exam and review of pertinent labs, studies and medications; meds reconciled; lifestyle modifications recommended. Key Obesity Meds             levothyroxine (SYNTHROID) 137 mcg tablet  (Taking) TAKE 1 TABLET BY MOUTH ON AN EMPTY STOMACH DAILY IN THE MORNING    metFORMIN ER (GLUCOPHAGE XR) 500 mg tablet  (Taking) Take 2,000 mg by mouth daily (with dinner).         No results found for: LEPTN, INSUL, HBA1C, GLU, CHOL, CHOLPOCT, HDL, LDLC, LDL, LDLCEXT, LDLCPOC, TRIGL, TGLPOCT, TSH, NA, NAPOC, K, KPOCT, GPT, ALTPOC, ALT, SGOT, ASTPOC, VITD3, CRP, PHH0CQBU, TSHEXT

## 2018-07-19 ENCOUNTER — TELEPHONE (OUTPATIENT)
Dept: FAMILY MEDICINE CLINIC | Age: 48
End: 2018-07-19

## 2018-07-26 ENCOUNTER — HOSPITAL ENCOUNTER (OUTPATIENT)
Dept: PHYSICAL THERAPY | Age: 48
Discharge: HOME | End: 2018-07-26
Payer: COMMERCIAL

## 2018-07-26 PROCEDURE — 97110 THERAPEUTIC EXERCISES: CPT

## 2018-07-26 PROCEDURE — 97161 PT EVAL LOW COMPLEX 20 MIN: CPT

## 2018-07-26 NOTE — PROGRESS NOTES
PT INITIAL EVALUATION NOTE - [facility] 2-15    Patient Name: Manas Ferguson  Date:2018  : 1970  [x]  Patient  Verified  Payor: BLUE CROSS / Plan: [address] [place] [address] / Product Type: PPO /    In time: 940 AM  Out time: 1030 AM  Total Treatment Time (min): 50  Total Timed Codes (min): 10  1:1 Treatment Time (MC only): 50   Visit #: 1     Treatment Area: Pain in right shoulder [M25.511]    SUBJECTIVE  Pain Level (0-10 scale): 1/10  Any medication changes, allergies to medications, adverse drug reactions, diagnosis change, or new procedure performed?: [] No    [x] Yes (see summary sheet for update)  Subjective:      Pt presents w/ chief complaint of R shldr pain, incr sx ~ 6 weeks ago, describes insidious onset, initially while sleeping on R side, intially lat prox UE, progressed to sx in R UT & cervical spine; saw NP Torri for shoulder pain, \"probable RC\", referred to PT, prescribed topical antiinflammatory which he is using PRN, prior to seeing NP taking oral antiinflammatory but unclear on recommendation currently, uses ice PRN     Describes episode of \"bursitits\" R shldr ~ 15 years ago, resolved w/ doing HEP from PCP, over years has had intermittent episodes, typically resolves w/ rest and doing ex    Pt also w/ c/o foot pain, initially injured ~ 10 years ago, fx cuboid bone & lat ankle sprain, casted, had PT, never fully recovered, episodes of flare ups ~ every 6 months, typically resolves w/ modified activity and use of cane; mentioned to podiatrist ~ 2 years ago when there for other issues, recommended arch supports, help a little, wears high ankle boots     Pain:   8/10 max 1/10 min 1/10 now     Aggravated by reaching and pulling back, reaching overhead, pulling up covers, weight of arm   Eased by anti inlammatory, ice, rest   Location of symptoms: lat prox UE  Description of symptoms:  Throb, ache, sharp     Diagnostic Tests: [] Lab work [] X-rays    [] CT [] MRI     [] Other:  Results (per report of the patient): none     PMH: Significant for DM II managed w/ meds, HTN, asthma, thyroid dysfunction     Social/Recreation/Work: works at [employer], software development, desk job, occas pain w/ sitting w/ shldr, adjustable desk; walks occas for exercise; placing overhead; difficulty sleeping/getting comfortable in bed     Prior level of function: able to use R UE to reach overhead, reach behind back, lift and pull items, fall asleep at night w/out pain/limitation     Patient goal(s): \"less pain, more strength\"     OBJECTIVE/EXAMINATION      Posture: Normal: []    Forward Head: [x]   Protracted Shoulders: [x]   Rotated:  [] R    [] L    C Lordosis:              [] Increased [] Decreased     T Kyphosis:  [] Increased [] Decreased  L Lordosis:  [] Increased [] Decreased      ROM:  [] Unable to assess at this time                                             AROM                                                                PROM   Right Left  Right Left   Flexion 145 pain 90 start 155 Flexion 145 pain 165   Scaption/ pain  90 start 158 Scaption/ABD nt nt   ER @ 0 Degrees 65 pain 62 ER @ 0 Degrees nt nt   ER @ 90 Degrees nt nt ER @ 90 Degrees 91 pain 95   IR @ 90 Degrees nt nt IR @ 90 Degrees 55 pain 90   Functional ER T2 pain T4      Functional IR L1 pain T5      Elbow Flexion wnl wnl Elbow Flexion wnl wnl   Elbow Extension wnl wnl Elbow Extension wnl wnl     Scapulohumoral Control / Rhythm: R scap dyskinesia, winging, decr eccentric control   Joint Mobility Assessment: Glenohumeral:  Ant hum head position        Acromioclavicular: wnl      Sternoclavicular: wnl    Strength:   [] Unable to assess at this time                                                                           MMT (0-5)    R (1-5) L (1-5)   Shoulder Flexion 4 pain 5   Shoulder Abduction 4 pain 5   Shoulder ER 4 pain 5   Shoulder IR 4 pain 5   Supraspinatus 4 pain 5   Elbow Flexion 5 5   Elbow Extension 5 5     Other: Palpation:   [] Min  [x] Mod  [x] Severe    Location: lat acrom   [x] Min  [x] Mod  [] Severe    Location: UT, lev scap insertion, incr m. Tone     Special Tests:  Painful arc  [x] Pos   [] Neg   Neer's    [x] Pos   [] Neg   Hawkin's/Alex [] Pos   [x] Neg   Crossover   [x] Pos   [] Neg   Empty Can  [] Pos   [x] Neg  Full Can   [] Pos   [x] Neg   Drop Arm Test [] Pos   [x] Neg  Lift Off Test  [] Pos   [x] Neg  Speed's Test  [] Pos   [x] Neg   Yergason's Test [] Pos   [x] Neg  Anterior Slide  [] Pos   [x] Neg  Biceps Load II  [] Pos   [x] Neg  O' Kwame  [] Pos   [x] Neg  Sulcus   [] Pos   [x] Neg  Load and Shift  [] Pos   [x] Neg  Apprehension  [] Pos   [x] Neg   Relocation  [] Pos   [x] Neg  Crank Test  [] Pos   [x] Neg   AC joint compress [] Pos   [x] Neg    Other Tests / Comments:     Outcome Measure: Patient presents with an initial FOTO score of 57/100.        Modality rationale: decrease inflammation and decrease pain to improve the patients ability to use R UE to reach overhead, reach behind back, lift and pull items, fall asleep at night w/out pain/limitation    Min Type Additional Details    [] Estim: []Att   []Unatt        []TENS instruct                  []IFC  []Premod   []NMES                     []Other:  []w/US   []w/ice   []w/heat  Position:  Location:    []  Traction: [] Cervical       []Lumbar                       [] Prone          []Supine                       []Intermittent   []Continuous Lbs:  [] before manual  [] after manual  []w/heat    []  Ultrasound: []Continuous   [] Pulsed at:                            []1MHz   []3MHz Location:  W/cm2:    []  Paraffin         Location:  []w/heat   To go  [x]  Ice     []  Heat  []  Ice massage Position:   Location:    []  Laser  []  Other: Position:  Location:    []  Vasopneumatic Device Pressure:       [] lo [] med [] hi   Temperature:    [x] Skin assessment post-treatment:  [x]intact []redness- no adverse reaction    []redness  adverse reaction: 10 min Therapeutic Exercise:  [x] See flow sheet : patient education, instruction HEP    Rationale: increase ROM, increase strength, improve coordination, improve balance and increase proprioception to improve the patients ability to use R UE to reach overhead, reach behind back, lift and pull items, fall asleep at night w/out pain/limitation           With   [x] TE   [] TA   [] neuro   [] other: Patient Education: [x] Review HEP    [] Progressed/Changed HEP based on:   [x] positioning   [x] body mechanics   [] transfers   [x] heat/ice application    [x] other:  maame/path, POC and role of PT, activity modification, postural principles, sleeping position, workstation ergonomic         Pain Level (0-10 scale) post treatment: 1/10      ASSESSMENT:      [x]  See Plan of [address] Cherise Patricio, PT 7/26/2018  9:44 AM

## 2018-07-26 NOTE — PROGRESS NOTES
[facility]  [address]  [place], [address]  Phone: [phone]  Fax: [phone]    Plan of Care/Statement of Necessity for Physical Therapy Services  2-15    Patient name: Loreta Mar  : 1970  Provider#: [id number]  Referral source: Alpesh Wild NP      Medical/Treatment Diagnosis: Pain in right shoulder [M25.511]     Prior Hospitalization: see medical history     Comorbidities: DM II managed w/ meds, HTN, asthma, thyroid dysfunction   Prior Level of Function: able to use R UE to reach overhead, reach behind back, lift and pull items, fall asleep at night w/out pain/limitation   Medications: Verified on Patient Summary List    Start of Care: 2018    Onset Date: 2018       The Plan of Care and following information is based on the information from the initial evaluation.   Assessment/ key information: pt is a 52year old male presents w/ signs/sx that suggest RC tendonitis/shoulder impingement syndrome     Evaluation Complexity History MEDIUM  Complexity : 1-2 comorbidities / personal factors will impact the outcome/ POC ; Examination LOW Complexity : 1-2 Standardized tests and measures addressing body structure, function, activity limitation and / or participation in recreation  ;Presentation LOW Complexity : Stable, uncomplicated  ;Clinical Decision Making MEDIUM Complexity : FOTO score of 26-74  Overall Complexity Rating: LOW     Problem List: pain affecting function, decrease ROM, decrease strength, decrease ADL/ functional abilitiies, decrease activity tolerance and decrease flexibility/ joint mobility   Treatment Plan may include any combination of the following: Therapeutic exercise, Therapeutic activities, Neuromuscular re-education, Physical agent/modality, Manual therapy and Patient education  Patient / Family readiness to learn indicated by: asking questions, trying to perform skills and interest  Persons(s) to be included in education: patient (P)  Barriers to Learning/Limitations: None  Patient Goal (s): less pain, more strength  Patient Self Reported Health Status: good  Rehabilitation Potential: good    Short Term Goals: To be accomplished in 1-2 weeks:  1) Patient will be independent with HEP  2) Patient will increase R shoulder flex AROM to >/= 120 degrees without pain so that can reach items in cabinets  3) Patient will report >/= 25% decrease in shoulder pain with ADLs  4) Patient will demonstrate understanding/application of postural principles/recommendations     Long Term Goals: To be accomplished in 4-6 weeks:  1) Patient will report >/= 75% decrease in shoulder pain  2) Patient will demonstrate independent with modified home/gym program without aggravation of shoulder pain  3) Patient will increase R shoulder flex AROM to >/= 150 degrees so that can reach overhead items in closet  4) Patient will increase R shoulder IR behind back AROM to >/= T8  5) Patient will report ability to lift/carry 20 pounds w/ R UE w/out increase pain     Frequency / Duration: Patient to be seen 1-2 times per week for 4-6 weeks. Patient/ Caregiver education and instruction: self care, activity modification, brace/ splint application and exercises    [x]  Plan of care has been reviewed with MANNY Duffy, PT 7/26/2018 7:27 PM    ________________________________________________________________________    I certify that the above Therapy Services are being furnished while the patient is under my care. I agree with the treatment plan and certify that this therapy is necessary.     [de-identified] Signature:____________________  Date:____________Time: _________

## 2018-07-31 ENCOUNTER — APPOINTMENT (OUTPATIENT)
Dept: PHYSICAL THERAPY | Age: 48
End: 2018-07-31
Payer: COMMERCIAL

## 2018-08-02 ENCOUNTER — HOSPITAL ENCOUNTER (OUTPATIENT)
Dept: PHYSICAL THERAPY | Age: 48
Discharge: HOME | End: 2018-08-02
Payer: COMMERCIAL

## 2018-08-02 PROCEDURE — 97140 MANUAL THERAPY 1/> REGIONS: CPT | Performed by: PHYSICAL THERAPY ASSISTANT

## 2018-08-02 PROCEDURE — 97110 THERAPEUTIC EXERCISES: CPT | Performed by: PHYSICAL THERAPY ASSISTANT

## 2018-08-02 NOTE — PROGRESS NOTES
PT DAILY TREATMENT NOTE 2-15 Patient Name: Lelo Dean Date:2018 : 1970 [x]  Patient  Verified Payor: BLUE [employer]OSS / Plan: [address] [place] [address] / Product Type: PPO / In time:10:00 AM  Out time:11:05 AM 
Total Treatment Time (min): 65 
1:1 time: 40 Visit #: 2 Treatment Area: Pain in right shoulder [M25.511] SUBJECTIVE Pain Level (0-10 scale): 1/10 Any medication changes, allergies to medications, adverse drug reactions, diagnosis change, or new procedure performed?: [x] No    [] Yes (see summary sheet for update) Subjective functional status/changes:   [] No changes reported Patient reports compliance with HEP, icing \"when it hurts\". States some discomfort when he sleeps at night, especially if he is sleeping on R side. OBJECTIVE Modality rationale: decrease inflammation and decrease pain to improve the patients ability to use R UE to reach overhead, reach behind back, lift and pull items, fall asleep at night w/out pain/limitation Min Type Additional Details  
 [] Estim: []Att   []Unatt        []TENS instruct []IFC  []Premod   []NMES []Other:  []w/US   []w/ice   []w/heat Position: Location:  
 []  Traction: [] Cervical       []Lumbar 
                     [] Prone          []Supine []Intermittent   []Continuous Lbs: 
[] before manual 
[] after manual 
[]w/heat  
 []  Ultrasound: []Continuous   [] Pulsed at:  
                         []1MHz   []3MHz Location: 
W/cm2:  
 []  Paraffin Location: 
[]w/heat  
10 [x]  Ice     []  Heat 
[]  Ice massage Position: seated Location: R shoulder  
 []  Laser 
[]  Other: Position: Location:  
 []  Vasopneumatic Device Pressure:       [] lo [] med [] hi  
Temperature:   
[x] Skin assessment post-treatment:  [x]intact []redness- no adverse reaction 
  []redness  adverse reaction:  
 
45 min Therapeutic Exercise:  [x] See flow sheet : reviewed HEP, added PG incline walkout/hold, retro UBE, s/l shoulder ER, supine SA punches Rationale: increase ROM, increase strength and improve coordination to improve the patients ability to use R UE to reach overhead, reach behind back, lift and pull items, fall asleep at night w/out pain/limitation 10 min Manual Therapy:  R GH joint posterior mobs grade II-III, PROm shoulder flexion combined with [address] joint distraction, IR with posterior glide Rationale: decrease pain, increase ROM and increase tissue extensibility  to improve the patients ability to use R UE to reach overhead, reach behind back, lift and pull items, fall asleep at night w/out pain/limitation With 
 [x] TE 
 [] TA 
 [] neuro 
 [] other: Patient Education: [x] Review HEP [] Progressed/Changed HEP based on:  
[] positioning   [] body mechanics   [] transfers   [x] heat/ice application   
[x] other:encouraged patient to sleep holding onto a pillow to allow for more neutral positioning in R shoulder. Also discussed increasing ice application to reduce pain/inflammation especially with increased activity. Discussion of impingement positions to avoid. Other Objective/Functional Measures: nt  
 
Pain Level (0-10 scale) post treatment: 0/10 ASSESSMENT/Changes in Function:  
 Minor cues for scapula positioning during PG incline walkouts and during s/l shoulder ER. No report of pain during exercises. Overall tolerated session well. Patient will continue to benefit from skilled PT services to modify and progress therapeutic interventions, address functional mobility deficits, address ROM deficits, address strength deficits, analyze and cue movement patterns and instruct in home and community integration to attain remaining goals. []  See Plan of Care 
[]  See progress note/recertification 
[]  See Discharge Summary Progress towards goals / Updated goals: 
nt 
 
PLAN 
[]  Upgrade activities as tolerated     [x]  Continue plan of care 
[]  Update interventions per flow sheet      
[]  Discharge due to:_ 
[]  Other:_   
 
Mague Medina PTA 8/2/2018  10:00 AM

## 2018-08-07 ENCOUNTER — HOSPITAL ENCOUNTER (OUTPATIENT)
Dept: PHYSICAL THERAPY | Age: 48
Discharge: HOME | End: 2018-08-07
Payer: COMMERCIAL

## 2018-08-07 PROCEDURE — 97110 THERAPEUTIC EXERCISES: CPT

## 2018-08-07 NOTE — PROGRESS NOTES
PT DAILY TREATMENT NOTE 2-15    Patient Name: Susana Rodgers  Date:2018  : 1970  [x]  Patient  Verified  Payor: BLUE [employer]OSS / Plan: [address] [place] [address] / Product Type: PPO /    In time:10:30 AM  Out time: 1130 AM  Total Treatment Time (min): 60  1:1 time: 50  Visit #: 3    Treatment Area: Pain in right shoulder [M25.511]    SUBJECTIVE  Pain Level (0-10 scale): /10  Any medication changes, allergies to medications, adverse drug reactions, diagnosis change, or new procedure performed?: [x] No    [] Yes (see summary sheet for update)  Subjective functional status/changes:   [] No changes reported  Patient reports increased shoulder pain today, notes incr sx since last night, unsure what may have aggravated     Patient also w/ report of R foot pain, injury in  when fell off a swing, bilat ankle sprain, R cuboid fx, casted, boot, PT, sx improved but never fully resolved    Sx aggravated by prolonged positioning cross legged, walking on sand or uneven surfaces, occas w/ transtion to standing/walking after prol sitting; episodes of constant pain typically 1-2 weeks, unsure what aggravates, uses a cane until sx resolve; mentioned to podiatrist ~ 3 years ago, recommended inserts, helps but sx still present; typically wears shoes w/ ankle support, aggravated if wears low shoes consecutive days     OBJECTIVE    Modality rationale: decrease inflammation and decrease pain to improve the patients ability to use R UE to reach overhead, reach behind back, lift and pull items, fall asleep at night w/out pain/limitation    Min Type Additional Details    [] Estim: []Att   []Unatt        []TENS instruct                  []IFC  []Premod   []NMES                     []Other:  []w/US   []w/ice   []w/heat  Position:  Location:    []  Traction: [] Cervical       []Lumbar                       [] Prone          []Supine                       []Intermittent   []Continuous Lbs:  [] before manual  [] after manual  []w/heat    []  Ultrasound: []Continuous   [] Pulsed at:                            []1MHz   []3MHz Location:  W/cm2:    []  Paraffin         Location:  []w/heat   10 [x]  Ice     []  Heat  []  Ice massage Position: seated  Location: R shoulder    []  Laser  []  Other: Position:  Location:    []  Vasopneumatic Device Pressure:       [] lo [] med [] hi   Temperature:    [x] Skin assessment post-treatment:  [x]intact []redness- no adverse reaction    []redness  adverse reaction:     50 min Therapeutic Exercise:  [x] See flow sheet : assessment R foot, instruction HEP as per written/illustrated instructions, added s/l shldr abd    Rationale: increase ROM, increase strength and improve coordination to improve the patients ability to use R UE to reach overhead, reach behind back, lift and pull items, fall asleep at night w/out pain/limitation     NT min Manual Therapy:  R GH joint posterior mobs grade II-III, PROm shoulder flexion combined with [facility] joint distraction, IR with posterior glide   Rationale: decrease pain, increase ROM and increase tissue extensibility  to improve the patients ability to use R UE to reach overhead, reach behind back, lift and pull items, fall asleep at night w/out pain/limitation             With   [x] TE   [] TA   [] neuro   [] other: Patient Education: [x] Review HEP    [x] Progressed/Changed HEP based on: written/illustrated instructions in chart   [] positioning   [] body mechanics   [] transfers   [x] heat/ice application    [x] other: maame/path, insert/shoe recommendations, ice use      Other Objective/Functional Measures:     Observation  Mild hallux valgus R   bilat pes planus  Toe out bilat R>L    ROM  Ankle DF hypo bilat PF, INV, EV WNL  Great toe hypo bilat     Strength  MMT bilat ankle 5/5 all motions     Gait   bilat toe out R>L, lat foot strike    Palpation  Mild tender to palpation R cuboid bone, bony malformation comp L     Special tests  Post drawer, talar tilt neg bilat   Ant drawer pos R     Pain Level (0-10 scale) post treatment: 1/10    ASSESSMENT/Changes in Function:     Modified ranges w/ shldr ex to avoid pain; Pt presents w/ signs/sx that suggest lat ankle & cuboid instability, demo's understanding recommendations for HEP     Patient will continue to benefit from skilled PT services to modify and progress therapeutic interventions, address functional mobility deficits, address ROM deficits, address strength deficits, analyze and cue movement patterns and instruct in home and community integration to attain remaining goals. [x]  See Plan of Care  []  See progress note/recertification  []  See Discharge Summary         Progress towards goals / Updated goals:    Short term goals (1-2 weeks):   1. Pt will be independent w/ HEP  2.  Pt will report >/= 25% decrease in R foot pain     Long term goals (4-6 weeks)  1. Pt will report >/= 75% decrease in R foot pain  2 .  Pt will report ability to walk on uneven surfaces w/out aggravation of sx     PLAN  []  Upgrade activities as tolerated     [x]  Continue plan of care  []  Update interventions per flow sheet       []  Discharge due to:_  [x]  Other: Continue PT for R shoulder, incorporate PT for R lat foot pain/instability      Juliette Patel, PT 8/7/2018  1025 AM

## 2018-08-09 ENCOUNTER — HOSPITAL ENCOUNTER (OUTPATIENT)
Dept: PHYSICAL THERAPY | Age: 48
Discharge: HOME | End: 2018-08-09
Payer: COMMERCIAL

## 2018-08-09 PROCEDURE — 97110 THERAPEUTIC EXERCISES: CPT | Performed by: PHYSICAL THERAPY ASSISTANT

## 2018-08-09 NOTE — PROGRESS NOTES
PT DAILY TREATMENT NOTE 2-15    Patient Name: Viviana Layne  Date:2018  : 1970  [x]  Patient  Verified  Payor: NICOLE JUAN F / Plan: [address] [place] [address] / Product Type: PPO /    In time:10:30 AM  Out time: 1150 AM  Total Treatment Time (min): 80  1:1 time: 30  Visit #: 4    Treatment Area: Pain in right shoulder [M25.511]    SUBJECTIVE  Pain Level (0-10 scale): 1/10  Any medication changes, allergies to medications, adverse drug reactions, diagnosis change, or new procedure performed?: [x] No    [] Yes (see summary sheet for update)  Subjective functional status/changes:   [] No changes reported  States doing well with ankle exercises after last visit.      OBJECTIVE    Modality rationale: decrease inflammation and decrease pain to improve the patients ability to use R UE to reach overhead, reach behind back, lift and pull items, fall asleep at night w/out pain/limitation    Min Type Additional Details    [] Estim: []Att   []Unatt        []TENS instruct                  []IFC  []Premod   []NMES                     []Other:  []w/US   []w/ice   []w/heat  Position:  Location:    []  Traction: [] Cervical       []Lumbar                       [] Prone          []Supine                       []Intermittent   []Continuous Lbs:  [] before manual  [] after manual  []w/heat    []  Ultrasound: []Continuous   [] Pulsed at:                            []1MHz   []3MHz Location:  W/cm2:    []  Paraffin         Location:  []w/heat   10 [x]  Ice     []  Heat  []  Ice massage Position: seated  Location: R shoulder    []  Laser  []  Other: Position:  Location:    []  Vasopneumatic Device Pressure:       [] lo [] med [] hi   Temperature:    [x] Skin assessment post-treatment:  [x]intact []redness- no adverse reaction    []redness  adverse reaction:     70 min Therapeutic Exercise:  [x] See flow sheet : added prone shoulder row and extension, short foot, toe yoga and seated inv with short foot   Rationale: increase ROM, increase strength and improve coordination to improve the patients ability to use R UE to reach overhead, reach behind back, lift and pull items, fall asleep at night w/out pain/limitation     NT min Manual Therapy:  R GH joint posterior mobs grade II-III, PROm shoulder flexion combined with GH joint distraction, IR with posterior glide   Rationale: decrease pain, increase ROM and increase tissue extensibility  to improve the patients ability to use R UE to reach overhead, reach behind back, lift and pull items, fall asleep at night w/out pain/limitation             With   [x] TE   [] TA   [] neuro   [] other: Patient Education: [x] Review HEP    [x] Progressed/Changed HEP based on: written/illustrated instructions in chart   [] positioning   [] body mechanics   [] transfers   [x] heat/ice application    [x] other:      Other Objective/Functional Measures: nt    Pain Level (0-10 scale) post treatment: 1/10    ASSESSMENT/Changes in Function:     Patient fatigued with addition of intrinsic foot strengthening. Discussion of OTC orthotics and supportive footwear today. Gave patient [place] shoe list as well as powerstep informational sheet. Minor cues for scapula positioning during supine ABC's otherwise tolerated session well. Patient will continue to benefit from skilled PT services to modify and progress therapeutic interventions, address functional mobility deficits, address ROM deficits, address strength deficits, analyze and cue movement patterns and instruct in home and community integration to attain remaining goals. [x]  See Plan of Care  []  See progress note/recertification  []  See Discharge Summary         Progress towards goals / Updated goals:    Short term goals (1-2 weeks):   1. Pt will be independent w/ HEP  2.  Pt will report >/= 25% decrease in R foot pain     Long term goals (4-6 weeks)  1. Pt will report >/= 75% decrease in R foot pain  2 .  Pt will report ability to walk on uneven surfaces w/out aggravation of sx     PLAN  []  Upgrade activities as tolerated     [x]  Continue plan of care  []  Update interventions per flow sheet       []  Discharge due to:_  [x]  Other: Continue PT for R shoulder, incorporate PT for R lat foot pain/instability      Cesilia Pak, PTA 8/9/2018  1030 AM

## 2018-08-14 ENCOUNTER — HOSPITAL ENCOUNTER (OUTPATIENT)
Dept: PHYSICAL THERAPY | Age: 48
Discharge: HOME | End: 2018-08-14
Payer: COMMERCIAL

## 2018-08-14 PROCEDURE — 97110 THERAPEUTIC EXERCISES: CPT

## 2018-08-14 NOTE — PROGRESS NOTES
PT DAILY TREATMENT NOTE 2-15    Patient Name: Juan Antonio Cruz  Date:2018  : 1970  [x]  Patient  Verified  Payor: NICOLE Digitel / Plan: [address] [place] [address] / Product Type: PPO /    In time:10:30 AM  Out time: 1110 AM  Total Treatment Time (min): 40  1:1 time: 25  Visit #: 5    Treatment Area: Pain in right shoulder [M25.511]    SUBJECTIVE  Pain Level (0-10 scale): 0/10  Any medication changes, allergies to medications, adverse drug reactions, diagnosis change, or new procedure performed?: [x] No    [] Yes (see summary sheet for update)  Subjective functional status/changes:   [] No changes reported  Pt reports \"actually doing better\"; reports that sometimes when he experiences pain he realizes his shoulder is poorly positioned and sx improve w/ correction     Pt requests abbreviated session today due to work     OBJECTIVE    Modality rationale: decrease inflammation and decrease pain to improve the patients ability to use R UE to reach overhead, reach behind back, lift and pull items, fall asleep at night w/out pain/limitation    Min Type Additional Details    [] Estim: []Att   []Unatt        []TENS instruct                  []IFC  []Premod   []NMES                     []Other:  []w/US   []w/ice   []w/heat  Position:  Location:    []  Traction: [] Cervical       []Lumbar                       [] Prone          []Supine                       []Intermittent   []Continuous Lbs:  [] before manual  [] after manual  []w/heat    []  Ultrasound: []Continuous   [] Pulsed at:                            []1MHz   []3MHz Location:  W/cm2:    []  Paraffin         Location:  []w/heat   To go  [x]  Ice     []  Heat  []  Ice massage Position: seated  Location: R shoulder    []  Laser  []  Other: Position:  Location:    []  Vasopneumatic Device Pressure:       [] lo [] med [] hi   Temperature:    [x] Skin assessment post-treatment:  [x]intact []redness- no adverse reaction    []redness  adverse reaction:     40 min Therapeutic Exercise:  [x] See flow sheet : added standing flex, scap, RROM D2 flex/ext, alt/akua, incr resistance/reps as per chart, modified treatment session due to time constraints    Rationale: increase ROM, increase strength and improve coordination to improve the patients ability to use R UE to reach overhead, reach behind back, lift and pull items, fall asleep at night w/out pain/limitation     NT min Manual Therapy:  R GH joint posterior mobs grade II-III, PROm shoulder flexion combined with [facility] joint distraction, IR with posterior glide   Rationale: decrease pain, increase ROM and increase tissue extensibility  to improve the patients ability to use R UE to reach overhead, reach behind back, lift and pull items, fall asleep at night w/out pain/limitation             With   [x] TE   [] TA   [] neuro   [] other: Patient Education: [x] Review HEP discussed alternating foot/ankle & shldr HEPs for time purposes   [x] Progressed/Changed HEP based on:   [] positioning   [] body mechanics   [] transfers   [x] heat/ice application    [] other:      Other Objective/Functional Measures: nt    Pain Level (0-10 scale) post treatment: 1/10    ASSESSMENT/Changes in Function:     Hold foot/ankle ex due to time constraints; muscle fatigue w/ shldr ex but w/out report of pain    Patient will continue to benefit from skilled PT services to modify and progress therapeutic interventions, address functional mobility deficits, address ROM deficits, address strength deficits, analyze and cue movement patterns and instruct in home and community integration to attain remaining goals.      [x]  See Plan of Care  []  See progress note/recertification  []  See Discharge Summary         Progress towards goals / Updated goals:  nt    PLAN  []  Upgrade activities as tolerated     [x]  Continue plan of care  []  Update interventions per flow sheet       []  Discharge due to:_  [x]  Other: cont PT x1 visit before pt away on vacation x 2 weeks     Mel Kwan, PT 8/14/2018  1035 AM

## 2018-08-16 ENCOUNTER — HOSPITAL ENCOUNTER (OUTPATIENT)
Dept: PHYSICAL THERAPY | Age: 48
Discharge: HOME | End: 2018-08-16
Payer: COMMERCIAL

## 2018-08-16 PROCEDURE — 97110 THERAPEUTIC EXERCISES: CPT | Performed by: PHYSICAL THERAPY ASSISTANT

## 2018-08-16 NOTE — PROGRESS NOTES
PT DAILY TREATMENT NOTE 2-15    Patient Name: Ulises Chaves  Date:2018  : 1970  [x]  Patient  Verified  Payor: BLUE CROSS / Plan: [address] [place] [address] / Product Type: PPO /    In time:10:30 AM  Out time: 11:35 AM  Total Treatment Time (min): 65  1:1 time: 40  Visit #: 6    Treatment Area: Pain in right shoulder [M25.511]    SUBJECTIVE  Pain Level (0-10 scale): 1/10  Any medication changes, allergies to medications, adverse drug reactions, diagnosis change, or new procedure performed?: [x] No    [] Yes (see summary sheet for update)  Subjective functional status/changes:   [] No changes reported  Pt reports doing well, greatest difficulty/report of pain is pulling covers up while he is in bed.        OBJECTIVE    Modality rationale: decrease inflammation and decrease pain to improve the patients ability to use R UE to reach overhead, reach behind back, lift and pull items, fall asleep at night w/out pain/limitation    Min Type Additional Details    [] Estim: []Att   []Unatt        []TENS instruct                  []IFC  []Premod   []NMES                     []Other:  []w/US   []w/ice   []w/heat  Position:  Location:    []  Traction: [] Cervical       []Lumbar                       [] Prone          []Supine                       []Intermittent   []Continuous Lbs:  [] before manual  [] after manual  []w/heat    []  Ultrasound: []Continuous   [] Pulsed at:                            []1MHz   []3MHz Location:  W/cm2:    []  Paraffin         Location:  []w/heat   10  [x]  Ice     []  Heat  []  Ice massage Position: seated  Location: R shoulder    []  Laser  []  Other: Position:  Location:    []  Vasopneumatic Device Pressure:       [] lo [] med [] hi   Temperature:    [x] Skin assessment post-treatment:  [x]intact []redness- no adverse reaction    []redness  adverse reaction:     55 min Therapeutic Exercise:  [x] See flow sheet : added short foot with SLS balance   Rationale: increase ROM, increase strength and improve coordination to improve the patients ability to use R UE to reach overhead, reach behind back, lift and pull items, fall asleep at night w/out pain/limitation     NT min Manual Therapy:  R GH joint posterior mobs grade II-III, PROm shoulder flexion combined with GH joint distraction, IR with posterior glide   Rationale: decrease pain, increase ROM and increase tissue extensibility  to improve the patients ability to use R UE to reach overhead, reach behind back, lift and pull items, fall asleep at night w/out pain/limitation             With   [x] TE   [] TA   [] neuro   [] other: Patient Education: [x] Review HEP discussed alternating foot/ankle & shldr HEPs for time purposes   [x] Progressed/Changed HEP based on:   [] positioning   [] body mechanics   [] transfers   [x] heat/ice application    [] other:      Other Objective/Functional Measures: nt    Pain Level (0-10 scale) post treatment: 1/10    ASSESSMENT/Changes in Function:     Improved postural awareness noted and overall minimal reports of pain. Reviewed sleeping positions with patient as well today. Challenged with addition of shortfoot with SLS balance. Patient will continue to benefit from skilled PT services to modify and progress therapeutic interventions, address functional mobility deficits, address ROM deficits, address strength deficits, analyze and cue movement patterns and instruct in home and community integration to attain remaining goals.      [x]  See Plan of Care  []  See progress note/recertification  []  See Discharge Summary         Progress towards goals / Updated goals:  nt    PLAN  []  Upgrade activities as tolerated     [x]  Continue plan of care  []  Update interventions per flow sheet       []  Discharge due to:_  [x]  Other:  pt away on vacation x 2 weeks     Magaly Kumari, PTA 8/16/2018  1030 AM

## 2018-09-04 ENCOUNTER — HOSPITAL ENCOUNTER (OUTPATIENT)
Dept: PHYSICAL THERAPY | Age: 48
End: 2018-09-04
Payer: COMMERCIAL

## 2018-09-11 ENCOUNTER — HOSPITAL ENCOUNTER (OUTPATIENT)
Dept: PHYSICAL THERAPY | Age: 48
Discharge: HOME | End: 2018-09-11
Payer: COMMERCIAL

## 2018-09-11 PROCEDURE — 97110 THERAPEUTIC EXERCISES: CPT

## 2018-09-11 NOTE — PROGRESS NOTES
PT DAILY TREATMENT NOTE/PROGRESS SUMMARY 2-15 Patient Name: Josephine Shaw Date:2018 : 1970 [x]  Patient  Verified Payor: [employer] / Plan: [address] [place] [address] / Product Type: PPO / In time:10:00 AM  Out time: 1055 AM 
Total Treatment Time (min): 55 
1:1 time: 40 Visit #: 0 Treatment Area: Pain in right shoulder [M25.511] SUBJECTIVE Pain Level (0-10 scale): 1-2/10 Any medication changes, allergies to medications, adverse drug reactions, diagnosis change, or new procedure performed?: [x] No    [] Yes (see summary sheet for update) Subjective functional status/changes:   [] No changes reported Pt admits poor compliance w/ HEP & ice since going away on vacation, notes increased pain levels OBJECTIVE Modality rationale: decrease inflammation and decrease pain to improve the patients ability to use R UE to reach overhead, reach behind back, lift and pull items, fall asleep at night w/out pain/limitation Min Type Additional Details  
 [] Estim: []Att   []Unatt        []TENS instruct []IFC  []Premod   []NMES []Other:  []w/US   []w/ice   []w/heat Position: Location:  
 []  Traction: [] Cervical       []Lumbar 
                     [] Prone          []Supine []Intermittent   []Continuous Lbs: 
[] before manual 
[] after manual 
[]w/heat  
 []  Ultrasound: []Continuous   [] Pulsed at:  
                         []1MHz   []3MHz Location: 
W/cm2:  
 []  Paraffin Location: 
[]w/heat  
10  [x]  Ice     []  Heat 
[]  Ice massage Position: seated Location: R shoulder  
 []  Laser 
[]  Other: Position: Location:  
 []  Vasopneumatic Device Pressure:       [] lo [] med [] hi  
Temperature:   
[x] Skin assessment post-treatment:  [x]intact []redness- no adverse reaction 
  []redness  adverse reaction:  
 
45 min Therapeutic Exercise:  [x] See flow sheet : resume shoulder ex as tolerated, reassessment performed Rationale: increase ROM, increase strength and improve coordination to improve the patients ability to use R UE to reach overhead, reach behind back, lift and pull items, fall asleep at night w/out pain/limitation NT min Manual Therapy:  R GH joint posterior mobs grade II-III, PROm shoulder flexion combined with [address] joint distraction, IR with posterior glide Rationale: decrease pain, increase ROM and increase tissue extensibility  to improve the patients ability to use R UE to reach overhead, reach behind back, lift and pull items, fall asleep at night w/out pain/limitation With 
 [x] TE 
 [] TA 
 [] neuro 
 [] other: Patient Education: [x] Review HEP [] Progressed/Changed HEP based on:  
[] positioning   [] body mechanics   [] transfers   [x] heat/ice application   
[] other:   
 
Other Objective/Functional Measures:  
 
ROM:  [] Unable to assess at this time AROM                                                                PROM 
  Right Left   Right Left Flexion 135 pain  155 Flexion 146 pain 165 Scaption/ pain 158 Scaption/ pain nt  
ER @ 0 Degrees 68  62 ER @ 0 Degrees nt nt ER @ 90 Degrees nt nt ER @ 90 Degrees 90 pain > 95 IR @ 90 Degrees nt nt IR @ 90 Degrees 46 pain 90 Functional ER T3 pain T4        
Functional IR L2 pain T5        
Elbow Flexion wnl wnl Elbow Flexion wnl wnl Elbow Extension wnl wnl Elbow Extension wnl wnl  
   
Strength:   [] Unable to assess at this time MMT (0-5)  
  R (1-5) L (1-5) Shoulder Flexion 4 5 Shoulder Abduction 4 pain 5 Shoulder ER 5 5 Shoulder IR 4 pain 5 Supraspinatus 4 5 Elbow Flexion 5 5 Elbow Extension 5 5  
  
Other:   
  
Palpation:  
[] Min  [x] Mod  [x] Severe    Location: sup/lat acrom reg FOTO outcome measure: 58/100 (initial 57/100) 
  
 Pain Level (0-10 scale) post treatment: 0/10 ASSESSMENT/Changes in Function:  
 
Patient has been seen for 7 skilled PT visits since 7/26/2018; unfortunately patient was away on vacation and then had work conflicts that did not allow him to come to PT for almost 4 weeks; during that time he admits noncompliance and sx worsened; pain free strength R shldr has improved per MMT however ROM has maintained and even worsened per todays measurements; pt demonstrates improved postural awareness which has helped to manage sx and improved GH/scapular mechanics; pt has met 3 of 9 goals set at initial evaluation; at this time we discussed resuming regular PT and need for improved compliance with HEP and use of ice to work toward goals Patient will continue to benefit from skilled PT services to modify and progress therapeutic interventions, address functional mobility deficits, address ROM deficits, address strength deficits, analyze and cue movement patterns and instruct in home and community integration to attain remaining goals. [x]  See Plan of Care 
[]  See progress note/recertification 
[]  See Discharge Summary Progress towards goals / Updated goals: 
 
Short Term Goals: To be accomplished in 1-2 weeks: 
1) Patient will be independent with HEP met 2) Patient will increase R shoulder flex AROM to >/= 120 degrees without pain so that can reach items in cabinetsmet 3) Patient will report >/= 25% decrease in shoulder pain with ADLs not met currently - had improved but has declined w/ non compliance met 4) Patient will demonstrate understanding/application of postural principles/recommendations  
  
Long Term Goals: To be accomplished in 4-6 weeks: 
1) Patient will report >/= 75% decrease in shoulder pain not met 2) Patient will demonstrate independent with modified home/gym program without aggravation of shoulder pain not met 3) Patient will increase R shoulder flex AROM to >/= 150 degrees so that can reach overhead items in closet not met 4) Patient will increase R shoulder IR behind back AROM to >/= T8 not met 5) Patient will report ability to lift/carry 20 pounds w/ R UE w/out increase pain  not met PLAN 
[]  Upgrade activities as tolerated     []  Continue plan of care 
[]  Update interventions per flow sheet      
[]  Discharge due to:_ 
[x]  Other: resume PT 2x/wk x 3-4 weeks after ~ 3-4 w/out PT due to travel and schedule conflicts Ariana Burns, PT 9/11/2018  1006 AM

## 2018-09-13 ENCOUNTER — HOSPITAL ENCOUNTER (OUTPATIENT)
Dept: PHYSICAL THERAPY | Age: 48
Discharge: HOME | End: 2018-09-13
Payer: COMMERCIAL

## 2018-09-13 PROCEDURE — 97110 THERAPEUTIC EXERCISES: CPT

## 2018-09-13 NOTE — PROGRESS NOTES
PT DAILY TREATMENT NOTE 2-15 Patient Name: Ricco Muhammad Date:2018 : 1970 [x]  Patient  Verified Payor: [employer] / Plan: [address] [place] [address] / Product Type: PPO / In time: 10:00 AM  Out time: 1055 AM 
Total Treatment Time (min): 55 
1:1 time: 40 Visit #: 2 Treatment Area: Pain in right shoulder [M25.511] SUBJECTIVE Pain Level (0-10 scale): 1/10 Any medication changes, allergies to medications, adverse drug reactions, diagnosis change, or new procedure performed?: [x] No    [] Yes (see summary sheet for update) Subjective functional status/changes:   [] No changes reported Pt reports \"little sore\" after last visit; able to do ex yesterday w/out any signif aggravation of sx OBJECTIVE Modality rationale: decrease inflammation and decrease pain to improve the patients ability to use R UE to reach overhead, reach behind back, lift and pull items, fall asleep at night w/out pain/limitation Min Type Additional Details  
 [] Estim: []Att   []Unatt        []TENS instruct []IFC  []Premod   []NMES []Other:  []w/US   []w/ice   []w/heat Position: Location:  
 []  Traction: [] Cervical       []Lumbar 
                     [] Prone          []Supine []Intermittent   []Continuous Lbs: 
[] before manual 
[] after manual 
[]w/heat  
 []  Ultrasound: []Continuous   [] Pulsed at:  
                         []1MHz   []3MHz Location: 
W/cm2:  
 []  Paraffin Location: 
[]w/heat  
10  [x]  Ice     []  Heat 
[]  Ice massage Position: seated Location: R shoulder  
 []  Laser 
[]  Other: Position: Location:  
 []  Vasopneumatic Device Pressure:       [] lo [] med [] hi  
Temperature:   
[x] Skin assessment post-treatment:  [x]intact []redness- no adverse reaction 
  []redness  adverse reaction:  
 
45 min Therapeutic Exercise:  [x] See flow sheet :   
 Rationale: increase ROM, increase strength and improve coordination to improve the patients ability to use R UE to reach overhead, reach behind back, lift and pull items, fall asleep at night w/out pain/limitation NT min Manual Therapy:  R GH joint posterior mobs grade II-III, PROm shoulder flexion combined with [address] joint distraction, IR with posterior glide Rationale: decrease pain, increase ROM and increase tissue extensibility  to improve the patients ability to use R UE to reach overhead, reach behind back, lift and pull items, fall asleep at night w/out pain/limitation With 
 [x] TE 
 [] TA 
 [] neuro 
 [] other: Patient Education: [x] Review HEP [] Progressed/Changed HEP based on:  
[] positioning   [] body mechanics   [] transfers   [x] heat/ice application   
[] other:   
 
Other Objective/Functional Measures:  
nt 
  
Pain Level (0-10 scale) post treatment: 0/10 ASSESSMENT/Changes in Function: Able to perform s/l shldr abd today to ~ 60 degrees w/out pain Patient will continue to benefit from skilled PT services to modify and progress therapeutic interventions, address functional mobility deficits, address ROM deficits, address strength deficits, analyze and cue movement patterns and instruct in home and community integration to attain remaining goals. [x]  See Plan of Care 
[]  See progress note/recertification 
[]  See Discharge Summary Progress towards goals / Updated goals: 
 
Short Term Goals:  To be accomplished in 1-2 weeks: 
nt 
 
PLAN 
[]  Upgrade activities as tolerated     [x]  Continue plan of care 
[]  Update interventions per flow sheet      
[]  Discharge due to:_ 
[]  Other:    
 
Janette Chávez, PT 9/13/2018  1006 AM

## 2018-09-20 ENCOUNTER — HOSPITAL ENCOUNTER (OUTPATIENT)
Dept: PHYSICAL THERAPY | Age: 48
Discharge: HOME | End: 2018-09-20
Payer: COMMERCIAL

## 2018-09-20 PROCEDURE — 97110 THERAPEUTIC EXERCISES: CPT

## 2018-09-20 NOTE — PROGRESS NOTES
PT DAILY TREATMENT NOTE/DISCHARGE SUMMARY -15 Patient Name: Marylee Edman Date:2018 : 1970 [x]  Patient  Verified Payor: [employer] / Plan: [address] [place] [address] / Product Type: PPO / In time:10:30 AM  Out time: 1115 AM 
Total Treatment Time (min): 45 
1:1 time: 25 Visit #: 9 Treatment Area: Pain in right shoulder [M25.511] SUBJECTIVE Pain Level (0-10 scale): 310 Any medication changes, allergies to medications, adverse drug reactions, diagnosis change, or new procedure performed?: [x] No    [] Yes (see summary sheet for update) Subjective functional status/changes:   [] No changes reported Pt reports decreased pain w/ lifting arm to side but has noticed increased pain with reaching to lift or pull items such as pulling a plug out of wall at work yesterday Pt requests discontinue PT at this time due to schedule conflicts with increased work load at work OBJECTIVE Modality rationale: decrease inflammation and decrease pain to improve the patients ability to use R UE to reach overhead, reach behind back, lift and pull items, fall asleep at night w/out pain/limitation Min Type Additional Details  
 [] Estim: []Att   []Unatt        []TENS instruct []IFC  []Premod   []NMES []Other:  []w/US   []w/ice   []w/heat Position: Location:  
 []  Traction: [] Cervical       []Lumbar 
                     [] Prone          []Supine []Intermittent   []Continuous Lbs: 
[] before manual 
[] after manual 
[]w/heat  
 []  Ultrasound: []Continuous   [] Pulsed at:  
                         []1MHz   []3MHz Location: 
W/cm2:  
 []  Paraffin Location: 
[]w/heat To go [x]  Ice     []  Heat 
[]  Ice massage Position: seated Location: R shoulder  
 []  Laser 
[]  Other: Position: Location:  
 []  Vasopneumatic Device Pressure:       [] lo [] med [] hi  
Temperature: Subjective:      Lakia Arteaga is a 21 m.o. female here with mother. Patient brought in for Foot Injury (bottom of rt foot)      History of Present Illness:  Here for injury to R heel that happened 2 weeks ago. Mom reports that she stepped on mulch and got a splinter in her foot. Mom reports that she removed splinter, but still is avoiding stepping on the foot. No fever.        Review of Systems   Constitutional: Negative for activity change, appetite change, crying, fatigue, fever, irritability and unexpected weight change.   HENT: Negative for congestion, ear discharge, rhinorrhea, sneezing and sore throat.    Eyes: Negative for discharge and redness.   Respiratory: Negative for cough, wheezing and stridor.    Cardiovascular: Negative for chest pain.   Gastrointestinal: Negative for abdominal pain, constipation, diarrhea and vomiting.   Genitourinary: Negative for decreased urine volume, dysuria, frequency and urgency.   Musculoskeletal: Negative for gait problem and myalgias.        R foot injury   Skin: Positive for wound.   Hematological: Negative for adenopathy.   Psychiatric/Behavioral: Negative for sleep disturbance.       Objective:     Physical Exam   Constitutional: She appears well-developed and well-nourished. She is active. No distress.   HENT:   Right Ear: Tympanic membrane normal.   Left Ear: Tympanic membrane normal.   Nose: Nose normal. No nasal discharge.   Mouth/Throat: Mucous membranes are moist. Dentition is normal. No tonsillar exudate. Oropharynx is clear. Pharynx is normal.   Eyes: Conjunctivae and EOM are normal. Pupils are equal, round, and reactive to light. Right eye exhibits no discharge. Left eye exhibits no discharge.   Neck: Normal range of motion. Neck supple. No neck adenopathy.   Cardiovascular: Normal rate, regular rhythm, S1 normal and S2 normal.  Pulses are strong.    No murmur heard.  Pulmonary/Chest: Breath sounds normal. No nasal flaring or stridor. No respiratory distress.  She has no wheezes. She has no rhonchi. She has no rales. She exhibits no retraction.   Abdominal: Soft. Bowel sounds are normal. She exhibits no distension and no mass. There is no hepatosplenomegaly. There is no tenderness. There is no rebound and no guarding.   Lymphadenopathy: No anterior cervical adenopathy or posterior cervical adenopathy. No supraclavicular adenopathy is present.   Neurological: She is alert.   Skin: Skin is warm and dry. No petechiae, no purpura and no rash noted. She is not diaphoretic. No cyanosis. No jaundice or pallor.   R heel with eschar noted. Tend to deep palp. No foreign object visualized   Nursing note and vitals reviewed.      Assessment:        1. Injury of right foot, initial encounter    2. Foreign body in right foot, initial encounter         Plan:       Lakia was seen today for foot injury.    Diagnoses and all orders for this visit:    Injury of right foot, initial encounter  -     X-Ray Foot 2 View Right; Future    Foreign body in right foot, initial encounter  -     X-Ray Foot 2 View Right; Future  -     mupirocin (BACTROBAN) 2 % ointment; Apply to affected area 3 times daily      Patient Instructions   bactroban as prescribed  Soak foot in warm water and epsom salts 3 times a day  Please make an appointment for no improvement by Friday          [x] Skin assessment post-treatment:  [x]intact []redness- no adverse reaction 
  []redness  adverse reaction:  
 
45 min Therapeutic Exercise:  [x] See flow sheet : review recommendations HEP, add body blade Rationale: increase ROM, increase strength and improve coordination to improve the patients ability to use R UE to reach overhead, reach behind back, lift and pull items, fall asleep at night w/out pain/limitation With 
 [x] TE 
 [] TA 
 [] neuro 
 [] other: Patient Education: [x] Review HEP/ind   
[] Progressed/Changed HEP based on:  
[] positioning   [] body mechanics   [] transfers   [x] heat/ice application   
[] other:   
 
Other Objective/Functional Measures: (at time of last assessment 9/11/2018) ROM:  [] Unable to assess at this time AROM                                                                PROM 
  Right Left   Right Left Flexion 135 pain  155 Flexion 146 pain 165 Scaption/ pain 158 Scaption/ pain nt  
ER @ 0 Degrees 68  62 ER @ 0 Degrees nt nt ER @ 90 Degrees nt nt ER @ 90 Degrees 90 pain > 95 IR @ 90 Degrees nt nt IR @ 90 Degrees 46 pain 90 Functional ER T3 pain T4        
Functional IR L2 pain T5        
Elbow Flexion wnl wnl Elbow Flexion wnl wnl Elbow Extension wnl wnl Elbow Extension wnl wnl  
   
Strength:   [] Unable to assess at this time MMT (0-5)  
  R (1-5) L (1-5) Shoulder Flexion 4 5 Shoulder Abduction 4 pain 5 Shoulder ER 5 5 Shoulder IR 4 pain 5 Supraspinatus 4 5 Elbow Flexion 5 5 Elbow Extension 5 5  
  
Other:   
  
Palpation:  
[] Min  [x] Mod  [x] Severe    Location: sup/lat acrom reg FOTO outcome measure: 58/100 (initial 57/100) 
  
Pain Level (0-10 scale) post treatment: 3/10 ASSESSMENT/Changes in Function:  
 
Patient has been seen for 9 skilled PT visits since 7/26/2018; unfortunately patient was away on vacation and then had work conflicts that did not allow him to come to PT for almost 4 weeks; during that time he admits noncompliance and sx worsened; pain free strength R shldr has improved per MMT however ROM has maintained and even worsened per measurements; pt demonstrates improved postural awareness which has helped to manage sx and improved GH/scapular mechanics; pt has met 4 of 9 goals set at initial evaluation; at this patient requests d/c PT due to time constraints with increased case load at work  
  
[]  See Plan of Care 
[]  See progress note/recertification 
[x]  See Discharge Summary Progress towards goals / Updated goals: 
 
Short Term Goals: To be accomplished in 1-2 weeks: 
1) Patient will be independent with HEP met 2) Patient will increase R shoulder flex AROM to >/= 120 degrees without pain so that can reach items in cabinetsmet 3) Patient will report >/= 25% decrease in shoulder pain with ADLs not met currently - had improved but has declined w/ non compliance met 4) Patient will demonstrate understanding/application of postural principles/recommendations met 
  
Long Term Goals: To be accomplished in 4-6 weeks: 
1) Patient will report >/= 75% decrease in shoulder pain not met 2) Patient will demonstrate independent with modified home/gym program without aggravation of shoulder pain met 3) Patient will increase R shoulder flex AROM to >/= 150 degrees so that can reach overhead items in closet not met 4) Patient will increase R shoulder IR behind back AROM to >/= T8 not met 5) Patient will report ability to lift/carry 20 pounds w/ R UE w/out increase pain  not met PLAN 
[]  Upgrade activities as tolerated     []  Continue plan of care 
[]  Update interventions per flow sheet [x]  Discharge due to: patient request due to time constraints with increased work loat at work  
[]  Other: 
 
Sylvain Babb, PT 9/20/2018  1006 AM

## 2018-09-20 NOTE — ANCILLARY DISCHARGE INSTRUCTIONS
PT DAILY TREATMENT NOTE/DISCHARGE SUMMARY 2-15 Patient Name: Juan Antonio Cruz Date:2018 : 1970 Referral source: Marily Wild NP Visits: 9 Treatment Area: Pain in right shoulder [M25.511] SUBJECTIVE Pt reports decreased pain w/ lifting arm to side but has noticed increased pain with reaching to lift or pull items such as pulling a plug out of wall at work yesterday Pt requests discontinue PT at this time due to schedule conflicts with increased work load at work OBJECTIVE 
 
(at time of last assessment 2018) ROM:  [] Unable to assess at this time AROM                                                                PROM 
  Right Left   Right Left Flexion 135 pain  155 Flexion 146 pain 165 Scaption/ pain 158 Scaption/ pain nt  
ER @ 0 Degrees 68  62 ER @ 0 Degrees nt nt ER @ 90 Degrees nt nt ER @ 90 Degrees 90 pain > 95 IR @ 90 Degrees nt nt IR @ 90 Degrees 46 pain 90 Functional ER T3 pain T4        
Functional IR L2 pain T5        
Elbow Flexion wnl wnl Elbow Flexion wnl wnl Elbow Extension wnl wnl Elbow Extension wnl wnl  
   
Strength:   [] Unable to assess at this time MMT (0-5)  
  R (1-5) L (1-5) Shoulder Flexion 4 5 Shoulder Abduction 4 pain 5 Shoulder ER 5 5 Shoulder IR 4 pain 5 Supraspinatus 4 5 Elbow Flexion 5 5 Elbow Extension 5 5  
  
Palpation:  
[] Min  [x] Mod  [x] Severe    Location: sup/lat acrom reg FOTO outcome measure: 58/100 (initial 57/100) ASSESSMENT/Changes in Function:  
 
Patient has been seen for 9 skilled PT visits since 2018; unfortunately patient was away on vacation and then had work conflicts that did not allow him to come to PT for almost 4 weeks; during that time he admits noncompliance and sx worsened; pain free strength R shldr has improved per MMT however ROM has maintained and even worsened per measurements; pt demonstrates improved postural awareness which has helped to manage sx and improved GH/scapular mechanics; pt has met 4 of 9 goals set at initial evaluation; at this patient requests d/c PT due to time constraints with increased case load at work Progress towards goals / Updated goals: 
 
Short Term Goals: To be accomplished in 1-2 weeks: 
1) Patient will be independent with HEP met 2) Patient will increase R shoulder flex AROM to >/= 120 degrees without pain so that can reach items in cabinetsmet 3) Patient will report >/= 25% decrease in shoulder pain with ADLs not met currently - had improved but has declined w/ non compliance met 4) Patient will demonstrate understanding/application of postural principles/recommendations met 
  
Long Term Goals: To be accomplished in 4-6 weeks: 
1) Patient will report >/= 75% decrease in shoulder pain not met 2) Patient will demonstrate independent with modified home/gym program without aggravation of shoulder pain met 3) Patient will increase R shoulder flex AROM to >/= 150 degrees so that can reach overhead items in closet not met 4) Patient will increase R shoulder IR behind back AROM to >/= T8 not met 5) Patient will report ability to lift/carry 20 pounds w/ R UE w/out increase pain  not met PLAN [x]  Discharge due to: patient request due to time constraints with increased work loat at work Rachel Reyes, PT 9/20/2018  1006 AM

## 2018-09-27 ENCOUNTER — APPOINTMENT (OUTPATIENT)
Dept: PHYSICAL THERAPY | Age: 48
End: 2018-09-27
Payer: COMMERCIAL

## 2018-11-07 LAB
CREATININE, EXTERNAL: 0.97
LDL-C, EXTERNAL: 138
MICROALBUMIN UR TEST STR-MCNC: 22.9 MG/DL

## 2019-07-23 ENCOUNTER — TELEPHONE (OUTPATIENT)
Dept: FAMILY MEDICINE CLINIC | Age: 49
End: 2019-07-23

## 2019-07-23 NOTE — TELEPHONE ENCOUNTER
CHIQUITA Johnston LPN             Overdue for CPE. Nehemias Blackwell need most recent office note and A1C from Dr. Diony Vines. Request for last office note and A1C faxed to Dr. Pushpa Penn office at [phone]. Confirmation received.     Patient has appointment for 7/25/2019

## 2019-07-25 ENCOUNTER — OFFICE VISIT (OUTPATIENT)
Dept: FAMILY MEDICINE CLINIC | Age: 49
End: 2019-07-25

## 2019-07-25 VITALS
SYSTOLIC BLOOD PRESSURE: 140 MMHG | TEMPERATURE: 98 F | HEART RATE: 78 BPM | DIASTOLIC BLOOD PRESSURE: 80 MMHG | BODY MASS INDEX: 35.76 KG/M2 | WEIGHT: 264 LBS | OXYGEN SATURATION: 98 % | HEIGHT: 72 IN | RESPIRATION RATE: 16 BRPM

## 2019-07-25 DIAGNOSIS — R33.9 INCOMPLETE BLADDER EMPTYING: ICD-10-CM

## 2019-07-25 DIAGNOSIS — E11.9 CONTROLLED TYPE 2 DIABETES MELLITUS WITHOUT COMPLICATION, WITHOUT LONG-TERM CURRENT USE OF INSULIN (HCC): Primary | ICD-10-CM

## 2019-07-25 DIAGNOSIS — R35.1 NOCTURIA: ICD-10-CM

## 2019-07-25 LAB
BILIRUB UR QL STRIP: NEGATIVE
GLUCOSE UR-MCNC: NORMAL MG/DL
KETONES P FAST UR STRIP-MCNC: NEGATIVE MG/DL
PH UR STRIP: 5.5 [PH] (ref 4.6–8)
PROT UR QL STRIP: NEGATIVE
SP GR UR STRIP: 1.02 (ref 1–1.03)
UA UROBILINOGEN AMB POC: NORMAL (ref 0.2–1)
URINALYSIS CLARITY POC: CLEAR
URINALYSIS COLOR POC: YELLOW
URINE BLOOD POC: NEGATIVE
URINE LEUKOCYTES POC: NEGATIVE
URINE NITRITES POC: NEGATIVE

## 2019-07-25 NOTE — PROGRESS NOTES
Assessment/Plan:     Diagnoses and all orders for this visit:    1. Controlled type 2 diabetes mellitus without complication, without long-term current use of insulin ([facility])  -      DIABETES FOOT EXAM  -     HEMOGLOBIN A1C WITH EAG  A1C pending. Encouraged more routine glucose monitoring. He is followed by endocrinology, Dr. George Hendrix. 2. Incomplete bladder emptying  -     AMB POC URINALYSIS DIP STICK AUTO W/O MICRO  Possibly prostate related. Labs pending. Avoid exacerbating foods. 3. Nocturia  -     PSA W/ REFLX FREE PSA  Labs pending. Treatment will be based on results. Follow-up and Dispositions    · Return in about 6 months (around 1/25/2020) for Complete Physical.         Discussed expected course/resolution/complications of diagnosis in detail with patient. Medication risks/benefits/costs/interactions/alternatives discussed with patient. Pt was given after visit summary which includes diagnoses, current medications & vitals. Pt expressed understanding with the diagnosis and plan          Subjective:      Nilsa Cardenas is a 50 y.o. male who presents for had concerns including Knee Swelling (lump right with pain); Incomplete Bladder Emptying; and Skin Problem. Patient is a 50 y.o. male that comes today for follow up of Diabetes Mellitus Type 2. Patient does not regularly monitor  their FSBG at home. . not attempting to follow a low fat, low cholesterol diet  Patients activity level: sedentery  lost,  4 lbs. .  The patient has not experienced recent hypoglycemia. reports that he has never smoked. He has never used smokeless tobacco.    Reports incomplete evacuation of urine for several weeks. Improved as he has tried more readily to empty bladder. Nocturia is 1-3 times nightly.          Lab Results   Component Value Date/Time    Hemoglobin A1c 11.2 (H) 10/01/2014 08:58 AM    Hemoglobin A1c 9.8 (H) 03/25/2014 10:23 AM    Hemoglobin A1c 7.1 (H) 04/03/2013 09:59 AM         Patient Active Problem List   Diagnosis Code    AR (allergic rhinitis) J30.9    Asthma J45.909    HTN (hypertension) I10    Hypothyroid E03.9    Elevated cholesterol E78.00    Severe obesity (BMI 35.0-39. 9) E66.01    Controlled type 2 diabetes mellitus without complication, without long-term current use of insulin (HCC) E11.9       Current Outpatient Medications   Medication Sig Dispense Refill    SYMBICORT 80-4.5 mcg/actuation HFAA INHALE 2 PUFFS TWICE A DAY RINSE MOUTH WELL AFTER USE  2    GLYXAMBI 10-5 mg tab TAKE 1 TABLET IN THE MORNING ONCE A DAY ORALLY 90 DAYS  2    levothyroxine (SYNTHROID) 137 mcg tablet 150 mcg. 1    metFORMIN ER (GLUCOPHAGE XR) 500 mg tablet Take 2,000 mg by mouth daily (with dinner).  diclofenac (VOLTAREN) 1 % gel Apply  to affected area four (4) times daily. 100 g 1    lisinopril (PRINIVIL, ZESTRIL) 20 mg tablet TAKE 1 TABLET BY MOUTH EVERY DAY 90 Tab 1    simvastatin (ZOCOR) 40 mg tablet TAKE 1 TABLET BY MOUTH AT BEDTIME 90 tablet 1    cholecalciferol (VITAMIN D3) 1,000 unit tablet Take  by mouth daily.  MULTIVITAMIN PO Take  by mouth.  omega-3 fatty acids-vitamin e (FISH OIL) 1,000 mg cap Take 1 Cap by mouth.  albuterol (PROVENTIL HFA, VENTOLIN HFA) 90 mcg/Actuation inhaler Take 1 Puff by inhalation every four (4) hours as needed for Wheezing. 1 Inhaler 1    aspirin 81 mg tablet Take  by mouth. No Known Allergies    ROS:   Review of Systems   Constitutional: Negative for malaise/fatigue. Eyes: Negative for blurred vision. Respiratory: Negative for shortness of breath. Cardiovascular: Negative for chest pain. Genitourinary: Positive for dysuria. Negative for flank pain, frequency, hematuria and urgency. Objective:     Visit Vitals  /80   Pulse 78   Temp 98 °F (36.7 °C) (Oral)   Resp 16   Ht 6' (1.829 m)   Wt 264 lb (119.7 kg)   SpO2 98%   BMI 35.80 kg/m²       Vitals and Nurse Documentation reviewed.      Physical Exam Constitutional: No distress. Cardiovascular: S1 normal and S2 normal. Exam reveals no gallop and no friction rub. No murmur heard. Pulmonary/Chest: Breath sounds normal. No respiratory distress. Skin: Skin is warm and dry. Monofilament intact bilaterally. Pulses R: 2+ and L: 2+. No open wounds. No skin lesions.      Psychiatric: Mood and affect normal.

## 2019-07-25 NOTE — PROGRESS NOTES
Chief Complaint   Patient presents with    Knee Swelling     lump right with pain    Incomplete Bladder Emptying    Skin Problem     1. Have you been to the ER, urgent care clinic since your last visit? Hospitalized since your last visit? No    2. Have you seen or consulted any other health care providers outside of the [address] [place] [address] since your last visit? Include any pap smears or colon screening.  No

## 2019-07-26 LAB
EST. AVERAGE GLUCOSE BLD GHB EST-MCNC: 169 MG/DL
HBA1C MFR BLD: 7.5 % (ref 4.8–5.6)
PSA SERPL-MCNC: 0.3 NG/ML (ref 0–4)
REFLEX CRITERIA: NORMAL

## 2019-07-27 NOTE — PROGRESS NOTES
Prostate testing is normal.  How is his night voiding? Could do a trial of Flomax if not better. A1C 7.5 so just above goal.  Does he want to work with dietitian first to get under 7%?

## 2019-07-30 NOTE — PROGRESS NOTES
Outbound call to patient at [phone]. Reviewed recent labs with patient. He states that his nighttime voiding has gotten slightly better. Patient does not want to see a dietician at this time. States that he is going to see his endocrinologist soon and would like to work with them first to see if he can get the A1c to normal range. Patient inquired about mediations he stated were supposed to be sent to the pharmacy. Pro-air inhaler, fungal powder (pt unsure of name) and diclofenac gel. Advised patient I would send a message to the provider. He was appreciative of call.

## 2019-07-31 RX ORDER — NYSTATIN 100000 [USP'U]/G
POWDER TOPICAL 2 TIMES DAILY
Qty: 30 G | Refills: 1 | Status: SHIPPED | OUTPATIENT
Start: 2019-07-31 | End: 2021-04-30 | Stop reason: ALTCHOICE

## 2019-07-31 RX ORDER — ALBUTEROL SULFATE 90 UG/1
1 AEROSOL, METERED RESPIRATORY (INHALATION)
Qty: 1 INHALER | Refills: 1 | Status: SHIPPED | OUTPATIENT
Start: 2019-07-31 | End: 2019-09-21 | Stop reason: SDUPTHER

## 2019-09-23 RX ORDER — ALBUTEROL SULFATE 90 UG/1
1 AEROSOL, METERED RESPIRATORY (INHALATION)
Qty: 8.5 INHALER | Refills: 1 | Status: SHIPPED | OUTPATIENT
Start: 2019-09-23

## 2020-09-04 LAB — HBA1C MFR BLD HPLC: 6.6 %

## 2021-04-30 ENCOUNTER — OFFICE VISIT (OUTPATIENT)
Dept: FAMILY MEDICINE CLINIC | Age: 51
End: 2021-04-30
Payer: COMMERCIAL

## 2021-04-30 VITALS
DIASTOLIC BLOOD PRESSURE: 85 MMHG | WEIGHT: 262 LBS | TEMPERATURE: 97.6 F | HEIGHT: 72 IN | RESPIRATION RATE: 16 BRPM | HEART RATE: 96 BPM | SYSTOLIC BLOOD PRESSURE: 126 MMHG | OXYGEN SATURATION: 96 % | BODY MASS INDEX: 35.49 KG/M2

## 2021-04-30 DIAGNOSIS — M79.642 BILATERAL HAND PAIN: Primary | ICD-10-CM

## 2021-04-30 DIAGNOSIS — M79.641 BILATERAL HAND PAIN: Primary | ICD-10-CM

## 2021-04-30 DIAGNOSIS — M25.552 BILATERAL HIP PAIN: ICD-10-CM

## 2021-04-30 DIAGNOSIS — M25.551 BILATERAL HIP PAIN: ICD-10-CM

## 2021-04-30 DIAGNOSIS — Z12.11 ENCOUNTER FOR SCREENING COLONOSCOPY: ICD-10-CM

## 2021-04-30 PROCEDURE — 99213 OFFICE O/P EST LOW 20 MIN: CPT | Performed by: NURSE PRACTITIONER

## 2021-04-30 RX ORDER — EMPAGLIFLOZIN AND LINAGLIPTIN 25; 5 MG/1; MG/1
TABLET, FILM COATED ORAL
COMMUNITY
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
## 2021-06-25 LAB
CREATININE, EXTERNAL: 1
HBA1C MFR BLD HPLC: 7.2 %
LDL-C, EXTERNAL: 66
MICROALBUMIN UR TEST STR-MCNC: <0.7 MG/DL

## 2021-09-09 LAB — SARS-COV-2, NAA: NEGATIVE

## 2021-11-16 ENCOUNTER — TELEPHONE (OUTPATIENT)
Dept: FAMILY MEDICINE CLINIC | Age: 51
End: 2021-11-16

## 2021-12-02 ENCOUNTER — TELEPHONE (OUTPATIENT)
Dept: FAMILY MEDICINE CLINIC | Age: 51
End: 2021-12-02

## 2021-12-02 NOTE — TELEPHONE ENCOUNTER
Attempted to call patient. Left voicemail to call the office back at [phone]. Pt needs a nurse visit to have A1c checked if it has not been checked anywhere else recently.

## 2021-12-10 ENCOUNTER — TELEPHONE (OUTPATIENT)
Dept: FAMILY MEDICINE CLINIC | Age: 51
End: 2021-12-10

## 2021-12-10 NOTE — TELEPHONE ENCOUNTER
Called patient and confirmed two identifiers. Informed patient we do not have a1c data earlier than 2020 and would like to schedule him an appointment. Patient manages a1c through endo Dr. Rekha Muro.      Most recent labs and a1c requested from endocrinologist.

## 2021-12-28 ENCOUNTER — OFFICE VISIT (OUTPATIENT)
Dept: FAMILY MEDICINE CLINIC | Age: 51
End: 2021-12-28
Payer: COMMERCIAL

## 2021-12-28 VITALS
BODY MASS INDEX: 33.94 KG/M2 | HEART RATE: 95 BPM | RESPIRATION RATE: 16 BRPM | TEMPERATURE: 97.6 F | SYSTOLIC BLOOD PRESSURE: 111 MMHG | HEIGHT: 72 IN | WEIGHT: 250.6 LBS | DIASTOLIC BLOOD PRESSURE: 73 MMHG | OXYGEN SATURATION: 97 %

## 2021-12-28 DIAGNOSIS — Z23 NEEDS FLU SHOT: ICD-10-CM

## 2021-12-28 DIAGNOSIS — M70.61 TROCHANTERIC BURSITIS OF RIGHT HIP: Primary | ICD-10-CM

## 2021-12-28 PROCEDURE — 99213 OFFICE O/P EST LOW 20 MIN: CPT | Performed by: FAMILY MEDICINE

## 2021-12-28 PROCEDURE — 90471 IMMUNIZATION ADMIN: CPT | Performed by: FAMILY MEDICINE

## 2021-12-28 PROCEDURE — 90686 IIV4 VACC NO PRSV 0.5 ML IM: CPT | Performed by: FAMILY MEDICINE

## 2021-12-28 NOTE — PROGRESS NOTES
Chief Complaint   Patient presents with    Hip Pain       1. \"Have you been to the ER, urgent care clinic since your last visit? Hospitalized since your last visit? \" Yes When: 09/2021 Where: [facility] Reason for visit: Bronchitis    2. \"Have you seen or consulted any other health care providers outside of the [address] [place] [address] since your last visit? \" No     3. For patients over 45: Has the patient had a colonoscopy?  Yes, HM satisfied with blue hyperlink         3 most recent PHQ Screens 12/28/2021   Little interest or pleasure in doing things Not at all   Feeling down, depressed, irritable, or hopeless Not at all   Total Score PHQ 2 0     Health Maintenance Due   Topic Date Due    Hepatitis C Screening  Never done    Eye Exam Retinal or Dilated  Never done    Foot Exam Q1  07/25/2020    Shingrix Vaccine Age 50> (1 of 2) Never done    Flu Vaccine (1) 09/01/2021    COVID-19 Vaccine (3 - Booster for Gutierrez Peter series) 10/29/2021

## 2021-12-28 NOTE — PROGRESS NOTES
Assessment/Plan:     Diagnoses and all orders for this visit:    1. Trochanteric bursitis of right hip  Improving. Continue OTC nsaids and discussed home exercises. Follow up if no improvement. 2. Needs flu shot  -     INFLUENZA VIRUS VAC QUAD,SPLIT,PRESV FREE SYRINGE IM             Discussed expected course/resolution/complications of diagnosis in detail with patient. Medication risks/benefits/costs/interactions/alternatives discussed with patient. Pt was given after visit summary which includes diagnoses, current medications & vitals. Pt expressed understanding with the diagnosis and plan          Subjective:      Christophe Steen is a 46 y.o. male who presents for had concerns including Hip Pain. Reports a one day history of right hip pain. Symptoms began suddenly after prolonged sitting. Symptoms are improving over time. This is his first evaluation. Has attempted otc Advil which has improved symptoms. Patient Active Problem List   Diagnosis Code    AR (allergic rhinitis) J30.9    Asthma J45.909    HTN (hypertension) I10    Hypothyroid E03.9    Elevated cholesterol E78.00    Severe obesity (BMI 35.0-39. 9) E66.01    Controlled type 2 diabetes mellitus without complication, without long-term current use of insulin (HCC) E11.9       Current Outpatient Medications   Medication Sig Dispense Refill    empagliflozin-linagliptin (Glyxambi) 25-5 mg tab Take  by mouth.  albuterol (PROVENTIL HFA, VENTOLIN HFA, PROAIR HFA) 90 mcg/actuation inhaler TAKE 1 PUFF BY INHALATION EVERY FOUR (4) HOURS AS NEEDED FOR WHEEZING. 8.5 Inhaler 1    SYMBICORT 80-4.5 mcg/actuation HFAA INHALE 2 PUFFS TWICE A DAY RINSE MOUTH WELL AFTER USE  2    levothyroxine (SYNTHROID) 137 mcg tablet 150 mcg. 1    metFORMIN ER (GLUCOPHAGE XR) 500 mg tablet Take 2,000 mg by mouth daily (with dinner).       lisinopril (PRINIVIL, ZESTRIL) 20 mg tablet TAKE 1 TABLET BY MOUTH EVERY DAY 90 Tab 1    simvastatin (ZOCOR) 40 mg tablet TAKE 1 TABLET BY MOUTH AT BEDTIME 90 tablet 1    aspirin 81 mg tablet Take  by mouth. (Patient not taking: Reported on 12/28/2021)         No Known Allergies    ROS:   Review of Systems   Constitutional: Negative for malaise/fatigue. Eyes: Negative for blurred vision. Respiratory: Negative for shortness of breath. Cardiovascular: Negative for chest pain. Musculoskeletal: Positive for joint pain. Objective:     Visit Vitals  /73 (BP 1 Location: Left upper arm, BP Patient Position: Sitting, BP Cuff Size: Large adult long)   Pulse 95   Temp 97.6 °F (36.4 °C) (Temporal)   Resp 16   Ht 6' (1.829 m)   Wt 250 lb 9.6 oz (113.7 kg)   SpO2 97%   BMI 33.99 kg/m²       Vitals and Nurse Documentation reviewed. Physical Exam  Constitutional:       Appearance: Normal appearance. He is obese. Neurological:      Mental Status: He is alert.    Psychiatric:         Attention and Perception: Attention normal.         Mood and Affect: Mood normal.         Speech: Speech normal.         Behavior: Behavior normal.         Cognition and Memory: Cognition normal.

## 2021-12-28 NOTE — PATIENT INSTRUCTIONS
Bursitis: Care Instructions  Your Care Instructions     A bursa is a small sac of fluid that helps the tissues around a joint slide over one another easily. Injury or overuse of a joint can cause pain, redness, and inflammation in the bursa (bursitis). Bursitis usually gets better if you avoid the activity that caused it. You can help prevent bursitis from coming back by doing stretching and strengthening exercises. You may also need to change the way you do some activities. Follow-up care is a key part of your treatment and safety. Be sure to make and go to all appointments, and call your doctor if you are having problems. It's also a good idea to know your test results and keep a list of the medicines you take. How can you care for yourself at home? · Put ice or a cold pack on the area for 10 to 20 minutes at a time. Try to do this every 1 to 2 hours for the next 3 days (when you are awake) or until the swelling goes down. Put a thin cloth between the ice and your skin. · After the 3 days of using ice, you may use heat on the area. You can use a hot water bottle; a warm, moist towel; or a heating pad set on low. You can also try alternating heat and ice. · Rest the area where you have pain. Stop any activities that cause pain. Switch to activities that do not stress the area. · Take pain medicines exactly as directed. ? If the doctor gave you a prescription medicine for pain, take it as prescribed. ? If you are not taking a prescription pain medicine, ask your doctor if you can take an over-the-counter medicine. ? Do not take two or more pain medicines at the same time unless the doctor told you to. Many pain medicines have acetaminophen, which is Tylenol. Too much acetaminophen (Tylenol) can be harmful. · To prevent stiffness, gently move the joint as much as you can without pain every day. As the pain gets better, keep doing range-of-motion exercises.  Ask your doctor for exercises that will make the muscles around the joint stronger. Do these as directed. · You can slowly return to the activity that caused the pain, but do it with less effort until you can do it without pain or swelling. Be sure to warm up before and stretch after you do the activity. When should you call for help? Call your doctor now or seek immediate medical care if:    · You have new or worse symptoms of infection, such as:  ? Increased pain, swelling, warmth, or redness. ? Red streaks leading from the area. ? Pus draining from the area. ? A fever. Watch closely for changes in your health, and be sure to contact your doctor if:    · You do not get better as expected. Where can you learn more? Go to http://www.gray.com/  Enter Q970 in the search box to learn more about \"Bursitis: Care Instructions. \"  Current as of: July 1, 2021               Content Version: 13.0  © 2006-2021 [employer]. Care instructions adapted under license by [employer] (which disclaims liability or warranty for this information). If you have questions about a medical condition or this instruction, always ask your healthcare professional. [place] [address] any warranty or liability for your use of this information. Hip Bursitis: Exercises  Introduction  Here are some examples of exercises for you to try. The exercises may be suggested for a condition or for rehabilitation. Start each exercise slowly. Ease off the exercises if you start to have pain. You will be told when to start these exercises and which ones will work best for you. How to do the exercises  Hip rotator stretch    1. Lie on your back with both knees bent and your feet flat on the floor. 2. Put the ankle of your affected leg on your opposite thigh near your knee. 3. Use your hand to gently push your knee away from your body until you feel a gentle stretch around your hip.   4. Hold the stretch for 15 to 30 seconds. 5. Repeat 2 to 4 times. 6. Repeat steps 1 through 5, but this time use your hand to gently pull your knee toward your opposite shoulder. Iliotibial band stretch    1. Lean sideways against a wall. If you are not steady on your feet, hold on to a chair or counter. 2. Stand on the leg with the affected hip, with that leg close to the wall. Then cross your other leg in front of it. 3. Let your affected hip drop out to the side of your body and against wall. Then lean away from your affected hip until you feel a stretch. 4. Hold the stretch for 15 to 30 seconds. 5. Repeat 2 to 4 times. Straight-leg raises to the outside    1. Lie on your side, with your affected hip on top. 2. Tighten the front thigh muscles of your top leg to keep your knee straight. 3. Keep your hip and your leg straight in line with the rest of your body, and keep your knee pointing forward. Do not drop your hip back. 4. Lift your top leg straight up toward the ceiling, about 12 inches off the floor. Hold for about 6 seconds, then slowly lower your leg. 5. Repeat 8 to 12 times. Clamshell    1. Lie on your side, with your affected hip on top and your head propped on a pillow. Keep your feet and knees together and your knees bent. 2. Raise your top knee, but keep your feet together. Do not let your hips roll back. Your legs should open up like a clamshell. 3. Hold for 6 seconds. 4. Slowly lower your knee back down. Rest for 10 seconds. 5. Repeat 8 to 12 times. Follow-up care is a key part of your treatment and safety. Be sure to make and go to all appointments, and call your doctor if you are having problems. It's also a good idea to know your test results and keep a list of the medicines you take. Where can you learn more? Go to http://www.[employer].com/  Enter H674 in the search box to learn more about \"Hip Bursitis: Exercises. \"  Current as of: July 1, 2021               Content Version: 13.0  © 6972-2620 [employer]. Care instructions adapted under license by [employer] (which disclaims liability or warranty for this information). If you have questions about a medical condition or this instruction, always ask your healthcare professional. [place] [address] any warranty or liability for your use of this information.

## 2022-03-19 PROBLEM — E11.9 CONTROLLED TYPE 2 DIABETES MELLITUS WITHOUT COMPLICATION, WITHOUT LONG-TERM CURRENT USE OF INSULIN (HCC): Status: ACTIVE | Noted: 2018-07-17

## 2022-05-16 ENCOUNTER — OFFICE VISIT (OUTPATIENT)
Dept: FAMILY MEDICINE CLINIC | Age: 52
End: 2022-05-16
Payer: COMMERCIAL

## 2022-05-16 VITALS
OXYGEN SATURATION: 96 % | SYSTOLIC BLOOD PRESSURE: 110 MMHG | TEMPERATURE: 97.4 F | BODY MASS INDEX: 34.13 KG/M2 | RESPIRATION RATE: 16 BRPM | HEIGHT: 72 IN | HEART RATE: 93 BPM | DIASTOLIC BLOOD PRESSURE: 74 MMHG | WEIGHT: 252 LBS

## 2022-05-16 DIAGNOSIS — Z13.220 SCREENING, LIPID: ICD-10-CM

## 2022-05-16 DIAGNOSIS — M25.551 BILATERAL HIP PAIN: ICD-10-CM

## 2022-05-16 DIAGNOSIS — Z00.00 ROUTINE GENERAL MEDICAL EXAMINATION AT A HEALTH CARE FACILITY: Primary | ICD-10-CM

## 2022-05-16 DIAGNOSIS — Z11.59 ENCOUNTER FOR HEPATITIS C SCREENING TEST FOR LOW RISK PATIENT: ICD-10-CM

## 2022-05-16 DIAGNOSIS — M25.552 BILATERAL HIP PAIN: ICD-10-CM

## 2022-05-16 DIAGNOSIS — E03.9 ACQUIRED HYPOTHYROIDISM: ICD-10-CM

## 2022-05-16 DIAGNOSIS — Z23 ENCOUNTER FOR IMMUNIZATION: ICD-10-CM

## 2022-05-16 DIAGNOSIS — Z12.5 ENCOUNTER FOR PROSTATE CANCER SCREENING: ICD-10-CM

## 2022-05-16 DIAGNOSIS — E11.9 CONTROLLED TYPE 2 DIABETES MELLITUS WITHOUT COMPLICATION, WITHOUT LONG-TERM CURRENT USE OF INSULIN (HCC): ICD-10-CM

## 2022-05-16 LAB
ALBUMIN SERPL-MCNC: 4.1 G/DL (ref 3.5–5)
ALBUMIN/GLOB SERPL: 1.2 {RATIO} (ref 1.1–2.2)
ALP SERPL-CCNC: 118 U/L (ref 45–117)
ALT SERPL-CCNC: 35 U/L (ref 12–78)
ANION GAP SERPL CALC-SCNC: 4 MMOL/L (ref 5–15)
AST SERPL-CCNC: 20 U/L (ref 15–37)
BASOPHILS # BLD: 0 K/UL (ref 0–0.1)
BASOPHILS NFR BLD: 0 % (ref 0–1)
BILIRUB SERPL-MCNC: 0.4 MG/DL (ref 0.2–1)
BUN SERPL-MCNC: 15 MG/DL (ref 6–20)
BUN/CREAT SERPL: 14 (ref 12–20)
CALCIUM SERPL-MCNC: 9.7 MG/DL (ref 8.5–10.1)
CHLORIDE SERPL-SCNC: 108 MMOL/L (ref 97–108)
CHOLEST SERPL-MCNC: 144 MG/DL
CO2 SERPL-SCNC: 27 MMOL/L (ref 21–32)
CREAT SERPL-MCNC: 1.09 MG/DL (ref 0.7–1.3)
CREAT UR-MCNC: 84.9 MG/DL
DIFFERENTIAL METHOD BLD: ABNORMAL
EOSINOPHIL # BLD: 0.2 K/UL (ref 0–0.4)
EOSINOPHIL NFR BLD: 2 % (ref 0–7)
ERYTHROCYTE [DISTWIDTH] IN BLOOD BY AUTOMATED COUNT: 12.3 % (ref 11.5–14.5)
EST. AVERAGE GLUCOSE BLD GHB EST-MCNC: 160 MG/DL
GLOBULIN SER CALC-MCNC: 3.3 G/DL (ref 2–4)
GLUCOSE SERPL-MCNC: 179 MG/DL (ref 65–100)
HBA1C MFR BLD: 7.2 % (ref 4–5.6)
HCT VFR BLD AUTO: 47.6 % (ref 36.6–50.3)
HCV AB SERPL QL IA: NONREACTIVE
HDLC SERPL-MCNC: 52 MG/DL
HDLC SERPL: 2.8 {RATIO} (ref 0–5)
HGB BLD-MCNC: 15.9 G/DL (ref 12.1–17)
IMM GRANULOCYTES # BLD AUTO: 0 K/UL (ref 0–0.04)
IMM GRANULOCYTES NFR BLD AUTO: 1 % (ref 0–0.5)
LDLC SERPL CALC-MCNC: 63.8 MG/DL (ref 0–100)
LYMPHOCYTES # BLD: 1.6 K/UL (ref 0.8–3.5)
LYMPHOCYTES NFR BLD: 24 % (ref 12–49)
MCH RBC QN AUTO: 32 PG (ref 26–34)
MCHC RBC AUTO-ENTMCNC: 33.4 G/DL (ref 30–36.5)
MCV RBC AUTO: 95.8 FL (ref 80–99)
MICROALBUMIN UR-MCNC: 0.64 MG/DL
MICROALBUMIN/CREAT UR-RTO: 8 MG/G (ref 0–30)
MONOCYTES # BLD: 0.6 K/UL (ref 0–1)
MONOCYTES NFR BLD: 9 % (ref 5–13)
NEUTS SEG # BLD: 4.5 K/UL (ref 1.8–8)
NEUTS SEG NFR BLD: 64 % (ref 32–75)
NRBC # BLD: 0 K/UL (ref 0–0.01)
NRBC BLD-RTO: 0 PER 100 WBC
PLATELET # BLD AUTO: 197 K/UL (ref 150–400)
PMV BLD AUTO: 10.2 FL (ref 8.9–12.9)
POTASSIUM SERPL-SCNC: 4.1 MMOL/L (ref 3.5–5.1)
PROT SERPL-MCNC: 7.4 G/DL (ref 6.4–8.2)
RBC # BLD AUTO: 4.97 M/UL (ref 4.1–5.7)
SODIUM SERPL-SCNC: 139 MMOL/L (ref 136–145)
TRIGL SERPL-MCNC: 141 MG/DL (ref ?–150)
TSH SERPL DL<=0.05 MIU/L-ACNC: 5.36 UIU/ML (ref 0.36–3.74)
VLDLC SERPL CALC-MCNC: 28.2 MG/DL
WBC # BLD AUTO: 6.9 K/UL (ref 4.1–11.1)

## 2022-05-16 PROCEDURE — 99396 PREV VISIT EST AGE 40-64: CPT | Performed by: NURSE PRACTITIONER

## 2022-05-16 PROCEDURE — 90471 IMMUNIZATION ADMIN: CPT | Performed by: NURSE PRACTITIONER

## 2022-05-16 PROCEDURE — 90750 HZV VACC RECOMBINANT IM: CPT | Performed by: NURSE PRACTITIONER

## 2022-05-16 RX ORDER — IPRATROPIUM BROMIDE 42 UG/1
SPRAY, METERED NASAL
COMMUNITY
Start: 2022-04-17

## 2022-05-16 NOTE — PROGRESS NOTES
Chief Complaint   Patient presents with    Physical       1. Have you been to the ER, urgent care clinic since your last visit? Hospitalized since your last visit? No    2. Have you seen or consulted any other health care providers outside of the [address] [place] [address] since your last visit? Include any pap smears or colon screening. Yes When: 1/2022 Where: [facility] Reason for visit: routine    3. For patients over 45: Has the patient had a colonoscopy? Yes - no Care Gap present       3 most recent PHQ Screens 5/16/2022   Little interest or pleasure in doing things Not at all   Feeling down, depressed, irritable, or hopeless Not at all   Total Score PHQ 2 0       Abuse Screening Questionnaire 5/16/2022   Do you ever feel afraid of your partner? N   Are you in a relationship with someone who physically or mentally threatens you? N   Is it safe for you to go home?  Y     Health Maintenance Due   Topic Date Due    Hepatitis C Screening  Never done    Pneumococcal 0-64 years (1 - PCV) Never done    Eye Exam Retinal or Dilated  Never done    Foot Exam Q1  07/25/2020    Shingrix Vaccine Age 50> (1 of 2) Never done    COVID-19 Vaccine (3 - Booster for Gutierrez Peter series) 09/29/2021

## 2022-05-16 NOTE — PROGRESS NOTES
Assessment/Plan:     Diagnoses and all orders for this visit:    1. Routine general medical examination at a health care facility  -     CBC WITH AUTOMATED DIFF; Future  -     METABOLIC PANEL, COMPREHENSIVE; Future    2. Screening, lipid    3. Controlled type 2 diabetes mellitus without complication, without long-term current use of insulin (HCC)  -     HEMOGLOBIN A1C WITH EAG; Future  -     LIPID PANEL; Future  -     MICROALBUMIN, UR, RAND W/ MICROALB/CREAT RATIO; Future  Continue current medications continue follow-up with endocrinology. 4. Acquired hypothyroidism  -     TSH 3RD GENERATION; Future    5. Encounter for prostate cancer screening  -     PSA W/ REFLX FREE PSA; Future    6. Encounter for hepatitis C screening test for low risk patient  -     HEPATITIS C AB; Future    7. Bilateral hip pain  -     REFERRAL TO ORTHOPEDICS    8. Encounter for immunization  -     ZOSTER VACC RECOMBINANT ADJUVANTED               Follow-up and Dispositions    · Return in about 1 year (around 5/16/2023) for Complete Physical.         Discussed expected course/resolution/complications of diagnosis in detail with patient.    Medication risks/benefits/costs/interactions/alternatives discussed with patient.    Pt was given after visit summary which includes diagnoses, current medications & vitals. Pt expressed understanding with the diagnosis and plan    HPI:   Evan Schumacher is a 46 y.o. male who presents for annual exam.      Cardiovascular Review:  The patient has diabetes, hypertension, hyperlipidemia and obesity. Diet and Lifestyle: generally follows a low fat low cholesterol diet  Home BP Monitoring: is not measured at home. Pertinent ROS: taking medications as instructed, no medication side effects noted, no TIA's, no chest pain on exertion, no dyspnea on exertion, no swelling of ankles. He is managed by Dr. Max Soriano. He is interested in ortho evaluation for left knee pain and bilateral hip pain. Current Outpatient Medications   Medication Sig Dispense Refill    ipratropium (ATROVENT) 42 mcg (0.06 %) nasal spray ADMINISTER 1-2 SPRAYS INTO BOTH NOSTRILS THREE TIMES A DAY AS NEEDED      empagliflozin-linagliptin (Glyxambi) 25-5 mg tab Take  by mouth.  albuterol (PROVENTIL HFA, VENTOLIN HFA, PROAIR HFA) 90 mcg/actuation inhaler TAKE 1 PUFF BY INHALATION EVERY FOUR (4) HOURS AS NEEDED FOR WHEEZING. 8.5 Inhaler 1    SYMBICORT 80-4.5 mcg/actuation HFAA INHALE 2 PUFFS TWICE A DAY RINSE MOUTH WELL AFTER USE  2    levothyroxine (SYNTHROID) 137 mcg tablet 150 mcg. 1    metFORMIN ER (GLUCOPHAGE XR) 500 mg tablet Take 2,000 mg by mouth daily (with dinner).  lisinopril (PRINIVIL, ZESTRIL) 20 mg tablet TAKE 1 TABLET BY MOUTH EVERY DAY 90 Tab 1    simvastatin (ZOCOR) 40 mg tablet TAKE 1 TABLET BY MOUTH AT BEDTIME 90 tablet 1      No Known Allergies   Patient Active Problem List   Diagnosis Code    AR (allergic rhinitis) J30.9    Asthma J45.909    HTN (hypertension) I10    Hypothyroid E03.9    Elevated cholesterol E78.00    Severe obesity (BMI 35.0-39. 9) E66.01    Controlled type 2 diabetes mellitus without complication, without long-term current use of insulin (HCC) E11.9     Past Medical History:   Diagnosis Date    AR (allergic rhinitis)      Asthma      HTN (hypertension)      Hypothyroid      Other and unspecified hyperlipidemia 4/08    Type II or unspecified type diabetes mellitus without mention of complication, not stated as uncontrolled 6/12      Past Surgical History:   Procedure Laterality Date    HX COLONOSCOPY  2016    Normal.  Repeat in 10 years. Dr. Facundo Hobson. No LMP for male patient.    Family History   Problem Relation Age of Onset    Hypertension Mother     Heart Disease Mother         CAD    Elevated Lipids Mother     Diabetes Father    [facility] Elevated Lipids Father     Hypertension Father     No Known Problems Sister     Allergy-severe Daughter     Other Son Autism      Social History     Socioeconomic History    Marital status:      Spouse name: Not on file    Number of children: Not on file    Years of education: Not on file    Highest education level: Not on file   Occupational History    Occupation:    Tobacco Use    Smoking status: Never Smoker    Smokeless tobacco: Never Used   Vaping Use    Vaping Use: Never used   Substance and Sexual Activity    Alcohol use: Yes     Comment: alexia    Drug use: No    Sexual activity: Yes     Partners: Female   Other Topics Concern     Service Not Asked    Blood Transfusions Not Asked    Caffeine Concern Not Asked    Occupational Exposure Not Asked    Hobby Hazards Not Asked    Sleep Concern Not Asked    Stress Concern Not Asked    Weight Concern Not Asked    Special Diet Not Asked    Back Care Not Asked    Exercise Yes     Comment: walking/karate   Brunetta Nipper Bike Helmet Not Asked    Seat Belt Not Asked    Self-Exams Not Asked   Social History Narrative    Not on file     Social Determinants of Health     Financial Resource Strain:     Difficulty of Paying Living Expenses: Not on file   Food Insecurity:     Worried About Running Out of Food in the Last Year: Not on file    Eri of Food in the Last Year: Not on file   Transportation Needs:     Lack of Transportation (Medical): Not on file    Lack of Transportation (Non-Medical):  Not on file   Physical Activity:     Days of Exercise per Week: Not on file    Minutes of Exercise per Session: Not on file   Stress:     Feeling of Stress : Not on file   Social Connections:     Frequency of Communication with Friends and Family: Not on file    Frequency of Social Gatherings with Friends and Family: Not on file    Attends Mandaeism Services: Not on file    Active Member of Clubs or Organizations: Not on file    Attends Club or Organization Meetings: Not on file    Marital Status: Not on file   Intimate Partner Violence:  Fear of Current or Ex-Partner: Not on file    Emotionally Abused: Not on file    Physically Abused: Not on file    Sexually Abused: Not on file   Housing Stability:     Unable to Pay for Housing in the Last Year: Not on file    Number of Places Lived in the Last Year: Not on file    Unstable Housing in the Last Year: Not on file        ROS:     Review of Systems   Constitutional: Negative for fever, malaise/fatigue and weight loss. HENT: Negative for hearing loss. Eyes: Negative for blurred vision and pain. Respiratory: Negative for cough and shortness of breath. Cardiovascular: Negative for chest pain, palpitations and leg swelling. Gastrointestinal: Negative for abdominal pain, blood in stool, constipation, diarrhea and melena. Genitourinary: Negative for dysuria and hematuria. Musculoskeletal: Positive for joint pain. Skin: Negative for rash. Neurological: Negative for headaches. Psychiatric/Behavioral: Negative for depression. The patient is not nervous/anxious and does not have insomnia. Physical Exam:     Visit Vitals  /74 (BP 1 Location: Right upper arm, BP Patient Position: Sitting, BP Cuff Size: Large adult long)   Pulse 93   Temp 97.4 °F (36.3 °C) (Temporal)   Resp 16   Ht 6' (1.829 m)   Wt 252 lb (114.3 kg)   SpO2 96%   BMI 34.18 kg/m²        Vital signs and nurse documentation reviewed. Physical Exam  Constitutional:       General: He is not in acute distress. Appearance: He is obese. HENT:      Right Ear: No drainage. No middle ear effusion. Tympanic membrane is not injected, erythematous or bulging. Left Ear: No drainage. No middle ear effusion. Tympanic membrane is not injected, erythematous or bulging. Eyes:      Pupils: Pupils are equal, round, and reactive to light. Neck:      Trachea: No tracheal deviation. Cardiovascular:      Pulses:           Dorsalis pedis pulses are 2+ on the right side and 2+ on the left side.         Posterior tibial pulses are 2+ on the right side and 2+ on the left side. Heart sounds: S1 normal and S2 normal. No murmur heard. No friction rub. No gallop. Pulmonary:      Breath sounds: Normal breath sounds. No wheezing. Abdominal:      General: Bowel sounds are normal. There is no distension. Palpations: Abdomen is soft. There is no mass. Tenderness: There is no abdominal tenderness. Lymphadenopathy:      Cervical: No cervical adenopathy. Skin:     General: Skin is warm and dry. Neurological:      Cranial Nerves: No cranial nerve deficit. Sensory: Sensation is intact. Motor: Motor function is intact.

## 2022-05-17 LAB
PSA SERPL-MCNC: 0.3 NG/ML (ref 0–4)
REFLEX CRITERIA: NORMAL

## 2022-10-27 ENCOUNTER — OFFICE VISIT (OUTPATIENT)
Dept: ORTHOPEDIC SURGERY | Age: 52
End: 2022-10-27
Payer: COMMERCIAL

## 2022-10-27 VITALS — WEIGHT: 254 LBS | BODY MASS INDEX: 33.66 KG/M2 | HEIGHT: 73 IN

## 2022-10-27 DIAGNOSIS — M25.551 BILATERAL HIP PAIN: Primary | ICD-10-CM

## 2022-10-27 DIAGNOSIS — M25.852 IMPINGEMENT SYNDROME, HIP, LEFT: ICD-10-CM

## 2022-10-27 DIAGNOSIS — M25.552 BILATERAL HIP PAIN: Primary | ICD-10-CM

## 2022-10-27 PROCEDURE — 99203 OFFICE O/P NEW LOW 30 MIN: CPT | Performed by: ORTHOPAEDIC SURGERY

## 2022-10-27 NOTE — PROGRESS NOTES
Ranulfo Maurer (: 1970) is a 46 y.o. male, patient, here for evaluation of the following chief complaint(s):  Hip Pain (Bilateral hip pain - sitting, laying flat on back is uncomfortable (right worse than left))       HPI:    Patient has mild discomfort both hips. The right is a bit worse than the left. Its positional with problems getting shoes and socks on and doing squatting activities. Sitting at his desk for a long period of time gives him some trouble. Did see his primary care who wanted a baseline evaluation of his hips. Patient is still doing reasonably well. No Known Allergies    Current Outpatient Medications   Medication Sig    ipratropium (ATROVENT) 42 mcg (0.06 %) nasal spray ADMINISTER 1-2 SPRAYS INTO BOTH NOSTRILS THREE TIMES A DAY AS NEEDED    empagliflozin-linagliptin (Glyxambi) 25-5 mg tab Take  by mouth. albuterol (PROVENTIL HFA, VENTOLIN HFA, PROAIR HFA) 90 mcg/actuation inhaler TAKE 1 PUFF BY INHALATION EVERY FOUR (4) HOURS AS NEEDED FOR WHEEZING. SYMBICORT 80-4.5 mcg/actuation HFAA INHALE 2 PUFFS TWICE A DAY RINSE MOUTH WELL AFTER USE    levothyroxine (SYNTHROID) 137 mcg tablet 150 mcg.    metFORMIN ER (GLUCOPHAGE XR) 500 mg tablet Take 2,000 mg by mouth daily (with dinner). lisinopril (PRINIVIL, ZESTRIL) 20 mg tablet TAKE 1 TABLET BY MOUTH EVERY DAY    simvastatin (ZOCOR) 40 mg tablet TAKE 1 TABLET BY MOUTH AT BEDTIME     No current facility-administered medications for this visit. Past Medical History:   Diagnosis Date    AR (allergic rhinitis)      Asthma      HTN (hypertension)      Hypothyroid      Other and unspecified hyperlipidemia     Type II or unspecified type diabetes mellitus without mention of complication, not stated as uncontrolled         Past Surgical History:   Procedure Laterality Date    HX COLONOSCOPY      Normal.  Repeat in 10 years. Dr. Hanny Colmenares.         Family History   Problem Relation Age of Onset    Hypertension Mother Heart Disease Mother         CAD    Elevated Lipids Mother     Diabetes Father     Elevated Lipids Father     Hypertension Father     No Known Problems Sister     Allergy-severe Daughter     Other Son         Autism        Social History     Socioeconomic History    Marital status:      Spouse name: Not on file    Number of children: Not on file    Years of education: Not on file    Highest education level: Not on file   Occupational History    Occupation:    Tobacco Use    Smoking status: Never    Smokeless tobacco: Never   Vaping Use    Vaping Use: Never used   Substance and Sexual Activity    Alcohol use: Yes     Comment: rarley    Drug use: No    Sexual activity: Yes     Partners: Female   Other Topics Concern     Service Not Asked    Blood Transfusions Not Asked    Caffeine Concern Not Asked    Occupational Exposure Not Asked    Hobby Hazards Not Asked    Sleep Concern Not Asked    Stress Concern Not Asked    Weight Concern Not Asked    Special Diet Not Asked    Back Care Not Asked    Exercise Yes     Comment: walking/karate    Bike Helmet Not Asked    Seat Belt Not Asked    Self-Exams Not Asked   Social History Narrative    Not on file     Social Determinants of Health     Financial Resource Strain: Not on file   Food Insecurity: Not on file   Transportation Needs: Not on file   Physical Activity: Not on file   Stress: Not on file   Social Connections: Not on file   Intimate Partner Violence: Not on file   Housing Stability: Not on file       ROS    Positive for: Musculoskeletal  Last edited by Adriana Haney on 10/27/2022  2:14 PM.            Vitals:  Ht 6' 1\" (1.854 m)   Wt 254 lb (115.2 kg)   BMI 33.51 kg/m²    Body mass index is 33.51 kg/m². PHYSICAL EXAM:  Bilateral lower extremities were examined. Both hips have positive impingement test and positive logroll test.  Right is a bit stiffer than the left. Still has full extension and 90 degrees of flexion both hips. Bilateral lower extremities are sensate and perfused. Skin is intact. IMAGING:  XR Results (most recent):  Results from Appointment encounter on 10/27/22    XR HIPS BI W OR WO AP PELV    Narrative  Bilateral hip x-rays. 4 views. Both hips exhibit signs of cam type femoral acetabular impingement. He has bilateral paralabral cysts. Bilateral loss of offset at the head neck junction. Overall the joint spaces remain well-maintained. He is tonus stage I.        ASSESSMENT/PLAN:  1. Bilateral hip pain  -     XR HIPS BI W OR WO AP PELV; Future  2. Impingement syndrome, hip, left    Discussed modification of working environment and activities. Standing desk would be of benefit. Observe for now. NSAIDs for the pain. Follow on an as-needed basis as his symptoms progress. An electronic signature was used to authenticate this note.   --Mindy Hernandez MD

## 2022-10-27 NOTE — LETTER
10/27/2022    Patient: Madison Qureshi   YOB: 1970   Date of Visit: 10/27/2022     Angelique Jordan NP  [address] [address]  [facility] [address]    Dear Angelique Jordan NP,      Thank you for referring Mr. Bryn Matias to [facility] for evaluation. My notes for this consultation are attached. If you have questions, please do not hesitate to call me. I look forward to following your patient along with you.       Sincerely,    Lila Bourgeois MD

## 2023-02-01 ENCOUNTER — VIRTUAL VISIT (OUTPATIENT)
Dept: FAMILY MEDICINE CLINIC | Age: 53
End: 2023-02-01
Payer: COMMERCIAL

## 2023-02-01 DIAGNOSIS — J45.41 MODERATE PERSISTENT ASTHMA WITH ACUTE EXACERBATION: Primary | ICD-10-CM

## 2023-02-01 PROCEDURE — 99214 OFFICE O/P EST MOD 30 MIN: CPT | Performed by: STUDENT IN AN ORGANIZED HEALTH CARE EDUCATION/TRAINING PROGRAM

## 2023-02-01 RX ORDER — FLUTICASONE FUROATE, UMECLIDINIUM BROMIDE AND VILANTEROL TRIFENATATE 100; 62.5; 25 UG/1; UG/1; UG/1
1 POWDER RESPIRATORY (INHALATION) DAILY
Qty: 60 EACH | Refills: 2 | Status: SHIPPED | OUTPATIENT
Start: 2023-02-01

## 2023-02-01 NOTE — PROGRESS NOTES
Goyo Arvizu  46 y.o. male  1970  [address] [place] [address]  [id number]   [facility]  Telemedicine Progress Note  [place]       Encounter Date and Time: February 1, 2023 at 11:33 AM    Consent:  He and/or the health care decision maker is aware that that he may receive a bill for this telephone service, depending on his insurance coverage, and has provided verbal consent to proceed: Yes    Chief Complaint   Patient presents with    URI       History of Present Illness   Goyo Arvizu is a 46 y.o. male was evaluated by synchronous (real-time) audio-video technology from home, through the [employer] Patient Portal.    URI:  Hx of asthma , takes Symbicort and Albuterol   [facility], flu and covid negative. Treated with Doxy and Medrol dose pack (still has a few days of doxy left, 10 day course). Was treated a few weeks earlier with Augmentin and steroids. Had CXR that did not show PNA and no elevation in WBC. Cough was improving on Doxy but returned last night. Had COVID in June   Taking Mucinex at night   Using albuterol neb     No fevers, chills, wheezing, sob   Has not seen pulm    Review of Systems   See HPI    Vitals/Objective:     General: alert, cooperative, no distress   Mental  status: mental status: alert, oriented to person, place, and time, normal mood, behavior, speech, dress, motor activity, and thought processes   Resp: resp: normal effort and no respiratory distress   Neuro: neuro: no gross deficits   Skin: skin: no discoloration or lesions of concern on visible areas   Due to this being a TeleHealth evaluation, many elements of the physical examination are unable to be assessed. Assessment and Plan:   Time-based coding, delete if not needed: I spent at least 15 minutes with this established patient, and >50% of the time was spent counseling and/or coordinating care regarding asthma    1.  Moderate persistent asthma with acute exacerbation  Has been sick/ asthma flare for past 1.5 months, gone through 2 rounds of abx and steroids. Will escalate maintenance inhaler and refer to Pulm. - fluticasone-umeclidinium-vilanterol (Trelegy Ellipta) 100-62.5-25 mcg inhaler; Take 1 Puff by inhalation daily. Dispense: 60 Each; Refill: 2  -If Trelegy is too expensive can try Anoro Ellipta + Flovent   -Referral to Pulm  -Complete current course of Doxycycline          We discussed the expected course, resolution and complications of the diagnosis(es) in detail. Medication risks, benefits, costs, interactions, and alternatives were discussed as indicated. I advised him to contact the office if his condition worsens, changes or fails to improve as anticipated. He expressed understanding with the diagnosis(es) and plan. Patient understands that this encounter was a temporary measure, and the importance of further follow up and examination was emphasized. Patient verbalized understanding. Patient informed to follow up: PRN. Electronically Signed: Shanta Díaz DO    Providers location when delivering service: clinic     CPT Codes 53625-59093 for Established Patients may apply to this Telehealth Visit. POS code: 18. Modifier GT      Pursuant to the emergency declaration under the [facility], [facility] waiver authority and the [email or web address] and [employer] Act, this Virtual  Visit was conducted, with patient's consent, to reduce the patient's risk of exposure to COVID-19 and provide continuity of care for an established patient. Services were provided through a video synchronous discussion virtually to substitute for in-person clinic visit. History   Patients past medical, surgical and family histories were reviewed and updated.       Past Medical History:   Diagnosis Date    AR (allergic rhinitis)      Asthma      HTN (hypertension)      Hypothyroid      Other and unspecified hyperlipidemia 4/08    Type II or unspecified type diabetes mellitus without mention of complication, not stated as uncontrolled 6/12     Past Surgical History:   Procedure Laterality Date    HX COLONOSCOPY  2016    Normal.  Repeat in 10 years. Dr. Giovanna Tamayo.       Family History   Problem Relation Age of Onset    Hypertension Mother     Heart Disease Mother         CAD    Elevated Lipids Mother     Diabetes Father     Elevated Lipids Father     Hypertension Father     No Known Problems Sister     Allergy-severe Daughter     Other Son         Autism     Social History     Socioeconomic History    Marital status:      Spouse name: Not on file    Number of children: Not on file    Years of education: Not on file    Highest education level: Not on file   Occupational History    Occupation:    Tobacco Use    Smoking status: Never    Smokeless tobacco: Never   Vaping Use    Vaping Use: Never used   Substance and Sexual Activity    Alcohol use: Yes     Comment: rarley    Drug use: No    Sexual activity: Yes     Partners: Female   Other Topics Concern     Service Not Asked    Blood Transfusions Not Asked    Caffeine Concern Not Asked    Occupational Exposure Not Asked    Hobby Hazards Not Asked    Sleep Concern Not Asked    Stress Concern Not Asked    Weight Concern Not Asked    Special Diet Not Asked    Back Care Not Asked    Exercise Yes     Comment: walking/karate    Bike Helmet Not Asked    Seat Belt Not Asked    Self-Exams Not Asked   Social History Narrative    Not on file     Social Determinants of Health     Financial Resource Strain: Not on file   Food Insecurity: Not on file   Transportation Needs: Not on file   Physical Activity: Not on file   Stress: Not on file   Social Connections: Not on file   Intimate Partner Violence: Not on file   Housing Stability: Not on file     Patient Active Problem List   Diagnosis Code    AR (allergic rhinitis) J30.9    Asthma J45.909 HTN (hypertension) I10    Hypothyroid E03.9    Elevated cholesterol E78.00    Severe obesity (BMI 35.0-39. 9) E66.01    Controlled type 2 diabetes mellitus without complication, without long-term current use of insulin (HCC) E11.9          Current Medications/Allergies   Medications and Allergies reviewed:    Current Outpatient Medications   Medication Sig Dispense Refill    fluticasone-umeclidinium-vilanterol (Trelegy Ellipta) 100-62.5-25 mcg inhaler Take 1 Puff by inhalation daily. 60 Each 2    ipratropium (ATROVENT) 42 mcg (0.06 %) nasal spray ADMINISTER 1-2 SPRAYS INTO BOTH NOSTRILS THREE TIMES A DAY AS NEEDED      empagliflozin-linagliptin (Glyxambi) 25-5 mg tab Take  by mouth. albuterol (PROVENTIL HFA, VENTOLIN HFA, PROAIR HFA) 90 mcg/actuation inhaler TAKE 1 PUFF BY INHALATION EVERY FOUR (4) HOURS AS NEEDED FOR WHEEZING. 8.5 Inhaler 1    levothyroxine (SYNTHROID) 137 mcg tablet 150 mcg. 1    metFORMIN ER (GLUCOPHAGE XR) 500 mg tablet Take 2,000 mg by mouth daily (with dinner).       lisinopril (PRINIVIL, ZESTRIL) 20 mg tablet TAKE 1 TABLET BY MOUTH EVERY DAY 90 Tab 1    simvastatin (ZOCOR) 40 mg tablet TAKE 1 TABLET BY MOUTH AT BEDTIME 90 tablet 1     No Known Allergies

## 2023-02-03 DIAGNOSIS — J45.41 MODERATE PERSISTENT ASTHMA WITH ACUTE EXACERBATION: Primary | ICD-10-CM

## 2023-02-03 RX ORDER — FLUTICASONE PROPIONATE 220 UG/1
1 AEROSOL, METERED RESPIRATORY (INHALATION) 2 TIMES DAILY
Qty: 12 G | Refills: 1 | Status: SHIPPED | OUTPATIENT
Start: 2023-02-03

## 2023-05-18 ENCOUNTER — OFFICE VISIT (OUTPATIENT)
Age: 53
End: 2023-05-18
Payer: COMMERCIAL

## 2023-05-18 VITALS
HEIGHT: 73 IN | HEART RATE: 107 BPM | WEIGHT: 251.8 LBS | TEMPERATURE: 97.6 F | SYSTOLIC BLOOD PRESSURE: 93 MMHG | BODY MASS INDEX: 33.37 KG/M2 | RESPIRATION RATE: 16 BRPM | DIASTOLIC BLOOD PRESSURE: 66 MMHG | OXYGEN SATURATION: 96 %

## 2023-05-18 DIAGNOSIS — Z12.5 ENCOUNTER FOR PROSTATE CANCER SCREENING: ICD-10-CM

## 2023-05-18 DIAGNOSIS — E11.9 CONTROLLED TYPE 2 DIABETES MELLITUS WITHOUT COMPLICATION, WITHOUT LONG-TERM CURRENT USE OF INSULIN (HCC): ICD-10-CM

## 2023-05-18 DIAGNOSIS — I10 PRIMARY HYPERTENSION: ICD-10-CM

## 2023-05-18 DIAGNOSIS — Z23 ENCOUNTER FOR IMMUNIZATION: ICD-10-CM

## 2023-05-18 DIAGNOSIS — Z00.00 ROUTINE GENERAL MEDICAL EXAMINATION AT A HEALTH CARE FACILITY: Primary | ICD-10-CM

## 2023-05-18 PROCEDURE — 90677 PCV20 VACCINE IM: CPT | Performed by: NURSE PRACTITIONER

## 2023-05-18 PROCEDURE — 99396 PREV VISIT EST AGE 40-64: CPT | Performed by: NURSE PRACTITIONER

## 2023-05-18 PROCEDURE — 90471 IMMUNIZATION ADMIN: CPT | Performed by: NURSE PRACTITIONER

## 2023-05-18 PROCEDURE — 3074F SYST BP LT 130 MM HG: CPT | Performed by: NURSE PRACTITIONER

## 2023-05-18 PROCEDURE — 3078F DIAST BP <80 MM HG: CPT | Performed by: NURSE PRACTITIONER

## 2023-05-18 RX ORDER — BUDESONIDE, GLYCOPYRROLATE, AND FORMOTEROL FUMARATE 160; 9; 4.8 UG/1; UG/1; UG/1
2 AEROSOL, METERED RESPIRATORY (INHALATION) 2 TIMES DAILY
COMMUNITY
Start: 2023-04-28

## 2023-05-18 RX ORDER — OMEGA-3/DHA/EPA/FISH OIL 300-1000MG
1 CAPSULE ORAL DAILY
COMMUNITY

## 2023-05-18 SDOH — ECONOMIC STABILITY: FOOD INSECURITY: WITHIN THE PAST 12 MONTHS, YOU WORRIED THAT YOUR FOOD WOULD RUN OUT BEFORE YOU GOT MONEY TO BUY MORE.: NEVER TRUE

## 2023-05-18 SDOH — ECONOMIC STABILITY: HOUSING INSECURITY
IN THE LAST 12 MONTHS, WAS THERE A TIME WHEN YOU DID NOT HAVE A STEADY PLACE TO SLEEP OR SLEPT IN A SHELTER (INCLUDING NOW)?: NO

## 2023-05-18 SDOH — ECONOMIC STABILITY: INCOME INSECURITY: HOW HARD IS IT FOR YOU TO PAY FOR THE VERY BASICS LIKE FOOD, HOUSING, MEDICAL CARE, AND HEATING?: NOT HARD AT ALL

## 2023-05-18 SDOH — ECONOMIC STABILITY: FOOD INSECURITY: WITHIN THE PAST 12 MONTHS, THE FOOD YOU BOUGHT JUST DIDN'T LAST AND YOU DIDN'T HAVE MONEY TO GET MORE.: NEVER TRUE

## 2023-05-18 ASSESSMENT — PATIENT HEALTH QUESTIONNAIRE - PHQ9
1. LITTLE INTEREST OR PLEASURE IN DOING THINGS: 0
SUM OF ALL RESPONSES TO PHQ QUESTIONS 1-9: 0
7. TROUBLE CONCENTRATING ON THINGS, SUCH AS READING THE NEWSPAPER OR WATCHING TELEVISION: 0
SUM OF ALL RESPONSES TO PHQ QUESTIONS 1-9: 0
3. TROUBLE FALLING OR STAYING ASLEEP: 0
8. MOVING OR SPEAKING SO SLOWLY THAT OTHER PEOPLE COULD HAVE NOTICED. OR THE OPPOSITE, BEING SO FIGETY OR RESTLESS THAT YOU HAVE BEEN MOVING AROUND A LOT MORE THAN USUAL: 0
4. FEELING TIRED OR HAVING LITTLE ENERGY: 0
SUM OF ALL RESPONSES TO PHQ9 QUESTIONS 1 & 2: 0
2. FEELING DOWN, DEPRESSED OR HOPELESS: 0
5. POOR APPETITE OR OVEREATING: 0
SUM OF ALL RESPONSES TO PHQ QUESTIONS 1-9: 0
9. THOUGHTS THAT YOU WOULD BE BETTER OFF DEAD, OR OF HURTING YOURSELF: 0
6. FEELING BAD ABOUT YOURSELF - OR THAT YOU ARE A FAILURE OR HAVE LET YOURSELF OR YOUR FAMILY DOWN: 0
10. IF YOU CHECKED OFF ANY PROBLEMS, HOW DIFFICULT HAVE THESE PROBLEMS MADE IT FOR YOU TO DO YOUR WORK, TAKE CARE OF THINGS AT HOME, OR GET ALONG WITH OTHER PEOPLE: 0
SUM OF ALL RESPONSES TO PHQ QUESTIONS 1-9: 0

## 2023-05-18 ASSESSMENT — ENCOUNTER SYMPTOMS
CONSTIPATION: 0
COUGH: 0
ABDOMINAL PAIN: 0
SHORTNESS OF BREATH: 0
EYE PAIN: 0
DIARRHEA: 0
BLOOD IN STOOL: 0

## 2023-05-19 LAB
ALBUMIN SERPL-MCNC: 4.7 G/DL (ref 3.5–5)
ALBUMIN/GLOB SERPL: 1.5 (ref 1.1–2.2)
ALP SERPL-CCNC: 104 U/L (ref 45–117)
ALT SERPL-CCNC: 69 U/L (ref 12–78)
ANION GAP SERPL CALC-SCNC: 5 MMOL/L (ref 5–15)
AST SERPL-CCNC: 38 U/L (ref 15–37)
BASOPHILS # BLD: 0.1 K/UL (ref 0–0.1)
BASOPHILS NFR BLD: 1 % (ref 0–1)
BILIRUB SERPL-MCNC: 0.8 MG/DL (ref 0.2–1)
BUN SERPL-MCNC: 22 MG/DL (ref 6–20)
BUN/CREAT SERPL: 20 (ref 12–20)
CALCIUM SERPL-MCNC: 10.3 MG/DL (ref 8.5–10.1)
CHLORIDE SERPL-SCNC: 106 MMOL/L (ref 97–108)
CHOLEST SERPL-MCNC: 137 MG/DL
CO2 SERPL-SCNC: 27 MMOL/L (ref 21–32)
CREAT SERPL-MCNC: 1.11 MG/DL (ref 0.7–1.3)
DIFFERENTIAL METHOD BLD: NORMAL
EOSINOPHIL # BLD: 0.2 K/UL (ref 0–0.4)
EOSINOPHIL NFR BLD: 2 % (ref 0–7)
ERYTHROCYTE [DISTWIDTH] IN BLOOD BY AUTOMATED COUNT: 11.9 % (ref 11.5–14.5)
GLOBULIN SER CALC-MCNC: 3.1 G/DL (ref 2–4)
GLUCOSE SERPL-MCNC: 189 MG/DL (ref 65–100)
HCT VFR BLD AUTO: 49.3 % (ref 36.6–50.3)
HDLC SERPL-MCNC: 51 MG/DL
HDLC SERPL: 2.7 (ref 0–5)
HGB BLD-MCNC: 16.3 G/DL (ref 12.1–17)
IMM GRANULOCYTES # BLD AUTO: 0 K/UL (ref 0–0.04)
IMM GRANULOCYTES NFR BLD AUTO: 0 % (ref 0–0.5)
LDLC SERPL CALC-MCNC: 65.2 MG/DL (ref 0–100)
LYMPHOCYTES # BLD: 1.7 K/UL (ref 0.8–3.5)
LYMPHOCYTES NFR BLD: 21 % (ref 12–49)
MCH RBC QN AUTO: 31.5 PG (ref 26–34)
MCHC RBC AUTO-ENTMCNC: 33.1 G/DL (ref 30–36.5)
MCV RBC AUTO: 95.4 FL (ref 80–99)
MONOCYTES # BLD: 0.6 K/UL (ref 0–1)
MONOCYTES NFR BLD: 7 % (ref 5–13)
NEUTS SEG # BLD: 5.5 K/UL (ref 1.8–8)
NEUTS SEG NFR BLD: 69 % (ref 32–75)
NRBC # BLD: 0 K/UL (ref 0–0.01)
NRBC BLD-RTO: 0 PER 100 WBC
PLATELET # BLD AUTO: 228 K/UL (ref 150–400)
PMV BLD AUTO: 10 FL (ref 8.9–12.9)
POTASSIUM SERPL-SCNC: 4.6 MMOL/L (ref 3.5–5.1)
PROT SERPL-MCNC: 7.8 G/DL (ref 6.4–8.2)
PSA SERPL-MCNC: 0.3 NG/ML (ref 0.01–4)
RBC # BLD AUTO: 5.17 M/UL (ref 4.1–5.7)
SODIUM SERPL-SCNC: 138 MMOL/L (ref 136–145)
TRIGL SERPL-MCNC: 104 MG/DL
VLDLC SERPL CALC-MCNC: 20.8 MG/DL
WBC # BLD AUTO: 8.1 K/UL (ref 4.1–11.1)

## 2024-01-01 NOTE — PROGRESS NOTES
FOREIGN TRAVEL CLINIC ASSESSMENT                Date of Departure: May 13, 2017          Date of return/duration of travel:  May 28, 2017         Countries to be visited, or transited, in exact chronological order:  [place], [place]    Travel is primarily:  [address]  Combination of above  Y  Visiting Friends & Relatives (VFR). N  [place]      Past Medical History:  Past Medical History:   Diagnosis Date    AR (allergic rhinitis)      Asthma      HTN (hypertension)      Hypothyroid      Other and unspecified hyperlipidemia 4/08    Type II or unspecified type diabetes mellitus without mention of complication, not stated as uncontrolled 6/12         If you are a woman, are you pregnant? Not applicable     Travel History:  Prior travel to other countries to which vaccines recommended/required? None    List of Countries:  Not applicable    Vaccination History:  Are routine childhood immunizations completed and up to date? Immunization History   Administered Date(s) Administered    Influenza Vaccine Split 10/26/2011    TDAP Vaccine 09/13/2012       Prior travel vaccines received:  None       Vaccine indications, contraindications, risks, benefits, schedules, routes discussed with patient and questions, comments discussed. RECOMMENDED Vaccines, Not received:  None           PRIOR ANTI-MALARIALS: none     PRIOR ANTI-DIARRHEAL THERAPY:  none      OTHER PRIOR TRAVEL MEDS:  none      PHYSICAL EXAM    Blood pressure 113/81, pulse 88, temperature 98.1 °F (36.7 °C), temperature source Oral, resp. rate 16, height 6' (1.829 m), weight 260 lb 9.6 oz (118.2 kg), SpO2 96 %. General appearance - alert, well appearing, and in no distress  Eye-Conjunctivae clear bilaterally  Chest - no tachypnea, retractions or cyanosis   Heart - normal rate      ASSESSMENT and PLAN    ICD-10-CM ICD-9-CM    1.  Encounter for counseling for travel Z71.89 V65.49 azithromycin (ZITHROMAX) 250 mg tablet atovaquone-proguanil (MALARONE) 250-100 mg per tablet   2. Encounter for immunization Z23 V03.89 HEPATITIS A VACCINE, ADULT DOSAGE, IM      TYPHOID VACCINE, LIVE, ORAL      YELLOW FEVER VACCINE, LIVE, SC       1. Reviewed anti-malarial medications & dosing with pt at visit. Alternative use of doxycycline for anti-malarial prophylaxis reviewed at visit also. Reviewed travel diarrhea use of azithromycin. All scripts reviewed with patient at visit. Information given to and reviewed with patient included:  traveler's diarrhea symptoms and management; insect precautions; and itinerary-specific [facility] report. 2.  Reviewed indications for yellow fever vaccine with pt at visit. Also reviewed:  --international requirements requiring vaccine for entry into certain countries. --live attenuated nature of vaccine, and side effects/frequency, including viscerotropic and neurotropic disease  --timing of seroconversion  --mechanism of seroconversion, with neutralizing antibody production in 7-10 days in most recipients  Pt has no absolute contraindications to vaccination. Follow-up Disposition:  Return in about 6 months (around 10/19/2017) for Hepatitis A #2 (sooner as reviewed if establishing PCP here). reviewed diet, exercise and weight control  reviewed medications and side effects in detail    For additional documentation of information and/or recommendations discussed this visit, please see notes in instructions. Plan and evaluation (above) reviewed with pt at visit  Patient voiced understanding of plan and provided with time to ask/review questions. After Visit Summary (AVS) provided to pt after visit with additional instructions as needed/reviewed. -Increased irritability, crying for long periods of time

## 2024-03-08 ENCOUNTER — TELEPHONE (OUTPATIENT)
Age: 54
End: 2024-03-08

## 2024-05-23 ENCOUNTER — HOSPITAL ENCOUNTER (OUTPATIENT)
Facility: HOSPITAL | Age: 54
Setting detail: RECURRING SERIES
Discharge: HOME | End: 2024-05-26
Attending: ORTHOPAEDIC SURGERY
Payer: COMMERCIAL

## 2024-05-23 PROCEDURE — 97110 THERAPEUTIC EXERCISES: CPT

## 2024-05-23 PROCEDURE — 97161 PT EVAL LOW COMPLEX 20 MIN: CPT

## 2024-05-23 PROCEDURE — 97140 MANUAL THERAPY 1/> REGIONS: CPT

## 2024-05-23 NOTE — THERAPY EVALUATION
[x]  Plan of care has been reviewed with MUSA Frances, PT       5/23/2024       8:23 AM        ===================================================================  I certify that the above Therapy Services are being furnished while the patient is under my care. I agree with the treatment plan and certify that this therapy is necessary.    Physician's Signature:_________________________   DATE:_________   TIME:________                           Trinity Javed MD    ** Signature, Date and Time must be completed for valid certification **  Please sign and fax to [phone].  Thank you

## 2024-06-13 ENCOUNTER — HOSPITAL ENCOUNTER (OUTPATIENT)
Facility: HOSPITAL | Age: 54
Setting detail: RECURRING SERIES
Discharge: HOME | End: 2024-06-16
Attending: ORTHOPAEDIC SURGERY
Payer: COMMERCIAL

## 2024-06-13 PROCEDURE — 97140 MANUAL THERAPY 1/> REGIONS: CPT

## 2024-06-13 PROCEDURE — 97110 THERAPEUTIC EXERCISES: CPT

## 2024-06-13 NOTE — PROGRESS NOTES
oscillations/shoulder ER/flexion, STM to infraspinatus, inferior mobs on GH joint grade 2-3, A/P mobs on GH joint grade 2-3   45     Total Total         Modalities Rationale:     decrease edema, decrease inflammation, and decrease pain to improve patient's ability to progress to PLOF and address remaining functional goals.       min [] Estim Unattended,             type/location:       []  w/ice    []  w/heat        min [] Estim Attended,             type/location:       []  w/ice   []  w/heat         []  w/US   []  TENS insruct            min []  Mechanical Traction,        type/lbs:        []  pro      []  sup           []  int       []  cont            []  before manual           []  after manual     min []  Ultrasound,         settings/location:     10 min  unbilled [x]  Ice     []  Heat            location/position: Supine, R shoulder        min []  Vasopneumatic Device,      press/temp:   pre-treatment girth :    post-treatment girth :    measured at (landmark       location) :   If using vaso (only need to measure limb vaso being performed on)        min []  Other:      Skin assessment post-treatment (if applicable):    [x]  intact    []  redness- no adverse reaction                 []redness - adverse reaction:          [x]  Patient Education billed concurrently with other procedures   [x] Review HEP    [] Progressed/Changed HEP, detail:    [] Other detail:         Other Objective/Functional Measures  NT    Pain Level at end of session (0-10 scale): 1      Assessment   Pt tolerated there-ex well today and demonstrates good shoulder PROM.  Patient will continue to benefit from skilled PT / OT services to modify and progress therapeutic interventions, analyze and address functional mobility deficits, analyze and address ROM deficits, analyze and address strength deficits, analyze and address soft tissue restrictions, analyze and cue for proper movement patterns, and analyze and modify for postural

## 2024-06-20 ENCOUNTER — HOSPITAL ENCOUNTER (OUTPATIENT)
Facility: HOSPITAL | Age: 54
Setting detail: RECURRING SERIES
Discharge: HOME | End: 2024-06-23
Attending: ORTHOPAEDIC SURGERY
Payer: COMMERCIAL

## 2024-06-20 PROCEDURE — 97110 THERAPEUTIC EXERCISES: CPT

## 2024-06-20 PROCEDURE — 97140 MANUAL THERAPY 1/> REGIONS: CPT

## 2024-06-20 NOTE — PROGRESS NOTES
PHYSICAL THERAPY - MEDICARE DAILY TREATMENT NOTE (updated 3/23)      Date: 2024          Patient Name:  Chun Neal :  1970   Medical   Diagnosis:  Acute pain of right shoulder [M25.511] Treatment Diagnosis:  M25.511  RIGHT SHOULDER PAIN    Referral Source:  Trinity Javed MD Insurance:   Payor: BCBS / Plan: BCBS OUT OF STATE / Product Type: *No Product type* /                     Patient  verified yes     Visit #   Current  / Total 3 MN   Time   In / Out 9:30 am 10:25   Total Treatment Time 55   Total Timed Codes 45   1:1 Treatment Time 45      [facility] BC Totals Reminder:  bill using total billable   min of TIMED therapeutic procedures and modalities.   8-22 min = 1 unit; 23-37 min = 2 units; 38-52 min = 3 units; 53-67 min = 4 units; 68-82 min = 5 units            SUBJECTIVE    Pain Level (0-10 scale): 0    Any medication changes, allergies to medications, adverse drug reactions, diagnosis change, or new procedure performed?: [x] No    [] Yes (see summary sheet for update)  Medications: Verified on Patient Summary List    Subjective functional status/changes:     Pt states he is feeling better w/o pain today.    OBJECTIVE      Therapeutic Procedures:  Tx Min Billable or 1:1 Min (if diff from Tx Min) Procedure, Rationale, Specifics   30  [id number] Therapeutic Exercise (timed):  increase ROM, strength, coordination, balance, and proprioception to improve patient's ability to progress to PLOF and address remaining functional goals. (see flow sheet as applicable)     Details if applicable:     15  36225 Manual Therapy (timed):  decrease pain, increase ROM, and increase tissue extensibility to improve patient's ability to progress to PLOF and address remaining functional goals.  The manual therapy interventions were performed at a separate and distinct time from the therapeutic activities interventions . (see flow sheet as applicable)     Details if applicable:  passive shoulder oscillations/shoulder

## 2024-07-03 ENCOUNTER — HOSPITAL ENCOUNTER (OUTPATIENT)
Facility: HOSPITAL | Age: 54
Setting detail: RECURRING SERIES
Discharge: HOME | End: 2024-07-06
Attending: ORTHOPAEDIC SURGERY
Payer: COMMERCIAL

## 2024-07-03 PROCEDURE — 97140 MANUAL THERAPY 1/> REGIONS: CPT

## 2024-07-03 PROCEDURE — 97110 THERAPEUTIC EXERCISES: CPT

## 2024-07-03 NOTE — PROGRESS NOTES
PHYSICAL THERAPY - MEDICARE DAILY TREATMENT NOTE (updated 3/23)      Date: 7/3/2024          Patient Name:  Chun Neal :  1970   Medical   Diagnosis:  Acute pain of right shoulder [M25.511] Treatment Diagnosis:  M25.511  RIGHT SHOULDER PAIN    Referral Source:  Trinity Javed MD Insurance:   Payor: BCBS / Plan: BCBS OUT OF STATE / Product Type: *No Product type* /                     Patient  verified yes     Visit #   Current  / Total 5 MN   Time   In / Out 9:30 am 10:15   Total Treatment Time 45   Total Timed Codes 45   1:1 Treatment Time 45      [facility] BC Totals Reminder:  bill using total billable   min of TIMED therapeutic procedures and modalities.   8-22 min = 1 unit; 23-37 min = 2 units; 38-52 min = 3 units; 53-67 min = 4 units; 68-82 min = 5 units            SUBJECTIVE    Pain Level (0-10 scale): 1    Any medication changes, allergies to medications, adverse drug reactions, diagnosis change, or new procedure performed?: [x] No    [] Yes (see summary sheet for update)  Medications: Verified on Patient Summary List    Subjective functional status/changes:     Pt states he is doing well but a little sore this morning.    OBJECTIVE      Therapeutic Procedures:  Tx Min Billable or 1:1 Min (if diff from Tx Min) Procedure, Rationale, Specifics   30  [id number] Therapeutic Exercise (timed):  increase ROM, strength, coordination, balance, and proprioception to improve patient's ability to progress to PLOF and address remaining functional goals. (see flow sheet as applicable)     Details if applicable:     15  17018 Manual Therapy (timed):  decrease pain, increase ROM, and increase tissue extensibility to improve patient's ability to progress to PLOF and address remaining functional goals.  The manual therapy interventions were performed at a separate and distinct time from the therapeutic activities interventions . (see flow sheet as applicable)     Details if applicable:  passive shoulder 
No

## 2024-07-10 ENCOUNTER — HOSPITAL ENCOUNTER (OUTPATIENT)
Facility: HOSPITAL | Age: 54
Setting detail: RECURRING SERIES
Discharge: HOME | End: 2024-07-13
Attending: ORTHOPAEDIC SURGERY
Payer: COMMERCIAL

## 2024-07-10 PROCEDURE — 97140 MANUAL THERAPY 1/> REGIONS: CPT

## 2024-07-10 PROCEDURE — 97110 THERAPEUTIC EXERCISES: CPT

## 2024-07-10 NOTE — PROGRESS NOTES
PHYSICAL THERAPY - MEDICARE DAILY TREATMENT NOTE (updated 3/23)      Date: 7/10/2024          Patient Name:  Chun Neal :  1970   Medical   Diagnosis:  Acute pain of right shoulder [M25.511] Treatment Diagnosis:  M25.511  RIGHT SHOULDER PAIN    Referral Source:  Trinity Javed MD Insurance:   Payor: BCBS / Plan: BCBS OUT OF STATE / Product Type: *No Product type* /                     Patient  verified yes     Visit #   Current  / Total 6 MN   Time   In / Out 9:30 am 10:25   Total Treatment Time 55   Total Timed Codes 45   1:1 Treatment Time 30      [facility] BC Totals Reminder:  bill using total billable   min of TIMED therapeutic procedures and modalities.   8-22 min = 1 unit; 23-37 min = 2 units; 38-52 min = 3 units; 53-67 min = 4 units; 68-82 min = 5 units            SUBJECTIVE    Pain Level (0-10 scale): 3    Any medication changes, allergies to medications, adverse drug reactions, diagnosis change, or new procedure performed?: [x] No    [] Yes (see summary sheet for update)  Medications: Verified on Patient Summary List    Subjective functional status/changes:     Pt states he slept on his neck wrong and having more tightness/pain on the L side this morning.    OBJECTIVE      Therapeutic Procedures:  Tx Min Billable or 1:1 Min (if diff from Tx Min) Procedure, Rationale, Specifics   30  87555 Therapeutic Exercise (timed):  increase ROM, strength, coordination, balance, and proprioception to improve patient's ability to progress to PLOF and address remaining functional goals. (see flow sheet as applicable)     Details if applicable:     15  92191 Manual Therapy (timed):  decrease pain, increase ROM, and increase tissue extensibility to improve patient's ability to progress to PLOF and address remaining functional goals.  The manual therapy interventions were performed at a separate and distinct time from the therapeutic activities interventions . (see flow sheet as applicable)     Details if

## 2024-07-17 ENCOUNTER — HOSPITAL ENCOUNTER (OUTPATIENT)
Facility: HOSPITAL | Age: 54
Setting detail: RECURRING SERIES
Discharge: HOME | End: 2024-07-20
Attending: ORTHOPAEDIC SURGERY
Payer: COMMERCIAL

## 2024-07-17 PROCEDURE — 97140 MANUAL THERAPY 1/> REGIONS: CPT

## 2024-07-17 PROCEDURE — 97110 THERAPEUTIC EXERCISES: CPT

## 2024-07-17 NOTE — PROGRESS NOTES
PHYSICAL THERAPY - MEDICARE DAILY TREATMENT NOTE (updated 3/23)      Date: 2024          Patient Name:  Chun Neal :  1970   Medical   Diagnosis:  Acute pain of right shoulder [M25.511] Treatment Diagnosis:  M25.511  RIGHT SHOULDER PAIN    Referral Source:  Trinity Javed MD Insurance:   Payor: BCBS / Plan: BCBS OUT OF STATE / Product Type: *No Product type* /                     Patient  verified yes     Visit #   Current  / Total 7 MN   Time   In / Out 9:30 am 10:20   Total Treatment Time 50   Total Timed Codes 40   1:1 Treatment Time 30      [facility] BC Totals Reminder:  bill using total billable   min of TIMED therapeutic procedures and modalities.   8-22 min = 1 unit; 23-37 min = 2 units; 38-52 min = 3 units; 53-67 min = 4 units; 68-82 min = 5 units            SUBJECTIVE    Pain Level (0-10 scale): 1-2    Any medication changes, allergies to medications, adverse drug reactions, diagnosis change, or new procedure performed?: [x] No    [] Yes (see summary sheet for update)  Medications: Verified on Patient Summary List    Subjective functional status/changes:     Pt states he feels better but just a little achy. Pt feels like his shoulder is weak.    OBJECTIVE      Therapeutic Procedures:  Tx Min Billable or 1:1 Min (if diff from Tx Min) Procedure, Rationale, Specifics   30 20 10387 Therapeutic Exercise (timed):  increase ROM, strength, coordination, balance, and proprioception to improve patient's ability to progress to PLOF and address remaining functional goals. (see flow sheet as applicable)     Details if applicable:     10 10 24632 Manual Therapy (timed):  decrease pain, increase ROM, and increase tissue extensibility to improve patient's ability to progress to PLOF and address remaining functional goals.  The manual therapy interventions were performed at a separate and distinct time from the therapeutic activities interventions . (see flow sheet as applicable)     Details if applicable:

## 2024-07-31 ENCOUNTER — HOSPITAL ENCOUNTER (OUTPATIENT)
Facility: HOSPITAL | Age: 54
Setting detail: RECURRING SERIES
Discharge: HOME | End: 2024-08-03
Attending: ORTHOPAEDIC SURGERY
Payer: COMMERCIAL

## 2024-07-31 PROCEDURE — 97110 THERAPEUTIC EXERCISES: CPT

## 2024-07-31 PROCEDURE — 97140 MANUAL THERAPY 1/> REGIONS: CPT

## 2024-07-31 NOTE — PROGRESS NOTES
PHYSICAL THERAPY - MEDICARE DAILY TREATMENT NOTE (updated 3/23)      Date: 2024          Patient Name:  Chun Neal :  1970   Medical   Diagnosis:  Acute pain of right shoulder [M25.511] Treatment Diagnosis:  M25.511  RIGHT SHOULDER PAIN    Referral Source:  Trinity Javed MD Insurance:   Payor: BCBS / Plan: BCBS OUT OF STATE / Product Type: *No Product type* /                     Patient  verified yes     Visit #   Current  / Total 8 MN   Time   In / Out 10:00 am 10:50 am   Total Treatment Time 50   Total Timed Codes 40   1:1 Treatment Time 30      [facility] BC Totals Reminder:  bill using total billable   min of TIMED therapeutic procedures and modalities.   8-22 min = 1 unit; 23-37 min = 2 units; 38-52 min = 3 units; 53-67 min = 4 units; 68-82 min = 5 units            SUBJECTIVE    Pain Level (0-10 scale): 1-2    Any medication changes, allergies to medications, adverse drug reactions, diagnosis change, or new procedure performed?: [x] No    [] Yes (see summary sheet for update)  Medications: Verified on Patient Summary List    Subjective functional status/changes:     Pt states he was sore after last session and shoulder feels about the same.    OBJECTIVE      Therapeutic Procedures:  Tx Min Billable or 1:1 Min (if diff from Tx Min) Procedure, Rationale, Specifics   30 20 [id number] Therapeutic Exercise (timed):  increase ROM, strength, coordination, balance, and proprioception to improve patient's ability to progress to PLOF and address remaining functional goals. (see flow sheet as applicable)     Details if applicable:     10 10 67360 Manual Therapy (timed):  decrease pain, increase ROM, and increase tissue extensibility to improve patient's ability to progress to PLOF and address remaining functional goals.  The manual therapy interventions were performed at a separate and distinct time from the therapeutic activities interventions . (see flow sheet as applicable)     Details if applicable: